# Patient Record
Sex: FEMALE | Race: WHITE | Employment: UNEMPLOYED
[De-identification: names, ages, dates, MRNs, and addresses within clinical notes are randomized per-mention and may not be internally consistent; named-entity substitution may affect disease eponyms.]

---

## 2017-01-25 PROCEDURE — 87624 HPV HI-RISK TYP POOLED RSLT: CPT | Performed by: OBSTETRICS & GYNECOLOGY

## 2017-01-25 PROCEDURE — 88175 CYTOPATH C/V AUTO FLUID REDO: CPT | Performed by: OBSTETRICS & GYNECOLOGY

## 2017-04-06 ENCOUNTER — SURGERY (OUTPATIENT)
Age: 37
End: 2017-04-06

## 2017-04-06 ENCOUNTER — HOSPITAL ENCOUNTER (OUTPATIENT)
Facility: HOSPITAL | Age: 37
Setting detail: OBSERVATION
Discharge: HOME | End: 2017-04-07
Attending: SURGERY | Admitting: SURGERY
Payer: COMMERCIAL

## 2017-04-06 PROBLEM — Z90.10 H/O MASTECTOMY: Status: ACTIVE | Noted: 2017-04-06

## 2017-04-06 PROCEDURE — 0HHV0NZ INSERTION OF TISSUE EXPANDER INTO BILATERAL BREAST, OPEN APPROACH: ICD-10-PCS | Performed by: PLASTIC SURGERY

## 2017-04-06 PROCEDURE — 86803 HEPATITIS C AB TEST: CPT | Performed by: PREVENTIVE MEDICINE

## 2017-04-06 PROCEDURE — 81025 URINE PREGNANCY TEST: CPT | Performed by: SURGERY

## 2017-04-06 PROCEDURE — 86703 HIV-1/HIV-2 1 RESULT ANTBDY: CPT | Performed by: PREVENTIVE MEDICINE

## 2017-04-06 PROCEDURE — 88307 TISSUE EXAM BY PATHOLOGIST: CPT | Performed by: SURGERY

## 2017-04-06 PROCEDURE — 87340 HEPATITIS B SURFACE AG IA: CPT | Performed by: PREVENTIVE MEDICINE

## 2017-04-06 PROCEDURE — 0HBV0ZZ EXCISION OF BILATERAL BREAST, OPEN APPROACH: ICD-10-PCS | Performed by: SURGERY

## 2017-04-06 DEVICE — IMPLANTABLE DEVICE: Type: IMPLANTABLE DEVICE | Site: BREAST | Status: FUNCTIONAL

## 2017-04-06 DEVICE — MATRIX TISS 20X16CM ALDRM THK: Type: IMPLANTABLE DEVICE | Site: BREAST | Status: FUNCTIONAL

## 2017-04-06 RX ORDER — FAMOTIDINE 20 MG/1
20 TABLET ORAL 2 TIMES DAILY
Status: DISCONTINUED | OUTPATIENT
Start: 2017-04-06 | End: 2017-04-07

## 2017-04-06 RX ORDER — ONDANSETRON 2 MG/ML
4 INJECTION INTRAMUSCULAR; INTRAVENOUS AS NEEDED
Status: DISCONTINUED | OUTPATIENT
Start: 2017-04-06 | End: 2017-04-06 | Stop reason: HOSPADM

## 2017-04-06 RX ORDER — KETOROLAC TROMETHAMINE 15 MG/ML
15 INJECTION, SOLUTION INTRAMUSCULAR; INTRAVENOUS EVERY 6 HOURS PRN
Status: DISCONTINUED | OUTPATIENT
Start: 2017-04-06 | End: 2017-04-07

## 2017-04-06 RX ORDER — FAMOTIDINE 10 MG/ML
20 INJECTION, SOLUTION INTRAVENOUS 2 TIMES DAILY
Status: DISCONTINUED | OUTPATIENT
Start: 2017-04-06 | End: 2017-04-07

## 2017-04-06 RX ORDER — METOCLOPRAMIDE HYDROCHLORIDE 5 MG/ML
10 INJECTION INTRAMUSCULAR; INTRAVENOUS AS NEEDED
Status: DISCONTINUED | OUTPATIENT
Start: 2017-04-06 | End: 2017-04-06 | Stop reason: HOSPADM

## 2017-04-06 RX ORDER — SODIUM CHLORIDE, SODIUM LACTATE, POTASSIUM CHLORIDE, CALCIUM CHLORIDE 600; 310; 30; 20 MG/100ML; MG/100ML; MG/100ML; MG/100ML
INJECTION, SOLUTION INTRAVENOUS CONTINUOUS
Status: DISCONTINUED | OUTPATIENT
Start: 2017-04-06 | End: 2017-04-06

## 2017-04-06 RX ORDER — METOCLOPRAMIDE HYDROCHLORIDE 5 MG/ML
10 INJECTION INTRAMUSCULAR; INTRAVENOUS EVERY 6 HOURS PRN
Status: DISCONTINUED | OUTPATIENT
Start: 2017-04-06 | End: 2017-04-07

## 2017-04-06 RX ORDER — ONDANSETRON 2 MG/ML
INJECTION INTRAMUSCULAR; INTRAVENOUS
Status: COMPLETED
Start: 2017-04-06 | End: 2017-04-06

## 2017-04-06 RX ORDER — KETOROLAC TROMETHAMINE 30 MG/ML
30 INJECTION, SOLUTION INTRAMUSCULAR; INTRAVENOUS EVERY 6 HOURS PRN
Status: DISCONTINUED | OUTPATIENT
Start: 2017-04-06 | End: 2017-04-07

## 2017-04-06 RX ORDER — DOCUSATE SODIUM 100 MG/1
100 CAPSULE, LIQUID FILLED ORAL 2 TIMES DAILY
Status: DISCONTINUED | OUTPATIENT
Start: 2017-04-06 | End: 2017-04-07

## 2017-04-06 RX ORDER — SODIUM CHLORIDE, SODIUM LACTATE, POTASSIUM CHLORIDE, CALCIUM CHLORIDE 600; 310; 30; 20 MG/100ML; MG/100ML; MG/100ML; MG/100ML
INJECTION, SOLUTION INTRAVENOUS CONTINUOUS
Status: DISCONTINUED | OUTPATIENT
Start: 2017-04-06 | End: 2017-04-07

## 2017-04-06 RX ORDER — LORAZEPAM 0.5 MG/1
0.5 TABLET ORAL 3 TIMES DAILY PRN
Status: DISCONTINUED | OUTPATIENT
Start: 2017-04-06 | End: 2017-04-07

## 2017-04-06 RX ORDER — DEXAMETHASONE SODIUM PHOSPHATE 4 MG/ML
4 VIAL (ML) INJECTION AS NEEDED
Status: DISCONTINUED | OUTPATIENT
Start: 2017-04-06 | End: 2017-04-06 | Stop reason: HOSPADM

## 2017-04-06 RX ORDER — DIPHENHYDRAMINE HYDROCHLORIDE 50 MG/ML
12.5 INJECTION INTRAMUSCULAR; INTRAVENOUS AS NEEDED
Status: DISCONTINUED | OUTPATIENT
Start: 2017-04-06 | End: 2017-04-06 | Stop reason: HOSPADM

## 2017-04-06 RX ORDER — MEPERIDINE HYDROCHLORIDE 25 MG/ML
12.5 INJECTION INTRAMUSCULAR; INTRAVENOUS; SUBCUTANEOUS AS NEEDED
Status: DISCONTINUED | OUTPATIENT
Start: 2017-04-06 | End: 2017-04-06 | Stop reason: HOSPADM

## 2017-04-06 RX ORDER — HYDROMORPHONE HYDROCHLORIDE 1 MG/ML
INJECTION, SOLUTION INTRAMUSCULAR; INTRAVENOUS; SUBCUTANEOUS
Status: COMPLETED
Start: 2017-04-06 | End: 2017-04-06

## 2017-04-06 RX ORDER — BISACODYL 10 MG
10 SUPPOSITORY, RECTAL RECTAL
Status: DISCONTINUED | OUTPATIENT
Start: 2017-04-06 | End: 2017-04-07

## 2017-04-06 RX ORDER — HYDROMORPHONE HYDROCHLORIDE 1 MG/ML
0.4 INJECTION, SOLUTION INTRAMUSCULAR; INTRAVENOUS; SUBCUTANEOUS EVERY 5 MIN PRN
Status: DISCONTINUED | OUTPATIENT
Start: 2017-04-06 | End: 2017-04-06 | Stop reason: HOSPADM

## 2017-04-06 RX ORDER — SODIUM PHOSPHATE, DIBASIC AND SODIUM PHOSPHATE, MONOBASIC 7; 19 G/133ML; G/133ML
1 ENEMA RECTAL
Status: DISCONTINUED | OUTPATIENT
Start: 2017-04-06 | End: 2017-04-07

## 2017-04-06 RX ORDER — CETIRIZINE HYDROCHLORIDE 10 MG/1
10 TABLET ORAL DAILY
Status: DISCONTINUED | OUTPATIENT
Start: 2017-04-06 | End: 2017-04-07

## 2017-04-06 RX ORDER — DEXTROSE AND SODIUM CHLORIDE 5; .9 G/100ML; G/100ML
INJECTION, SOLUTION INTRAVENOUS CONTINUOUS
Status: DISCONTINUED | OUTPATIENT
Start: 2017-04-06 | End: 2017-04-07

## 2017-04-06 RX ORDER — CYCLOBENZAPRINE HCL 10 MG
10 TABLET ORAL NIGHTLY
Status: DISCONTINUED | OUTPATIENT
Start: 2017-04-06 | End: 2017-04-07

## 2017-04-06 RX ORDER — ONDANSETRON 2 MG/ML
4 INJECTION INTRAMUSCULAR; INTRAVENOUS EVERY 6 HOURS PRN
Status: DISCONTINUED | OUTPATIENT
Start: 2017-04-06 | End: 2017-04-07

## 2017-04-06 RX ORDER — HYDROMORPHONE HYDROCHLORIDE 1 MG/ML
0.4 INJECTION, SOLUTION INTRAMUSCULAR; INTRAVENOUS; SUBCUTANEOUS
Status: DISCONTINUED | OUTPATIENT
Start: 2017-04-06 | End: 2017-04-07

## 2017-04-06 RX ORDER — ALBUTEROL SULFATE 90 UG/1
2 AEROSOL, METERED RESPIRATORY (INHALATION) EVERY 4 HOURS PRN
Status: DISCONTINUED | OUTPATIENT
Start: 2017-04-06 | End: 2017-04-07

## 2017-04-06 RX ORDER — CETIRIZINE HYDROCHLORIDE 10 MG/1
10 TABLET ORAL DAILY
COMMUNITY
End: 2019-05-16

## 2017-04-06 RX ORDER — MIDAZOLAM HYDROCHLORIDE 1 MG/ML
1 INJECTION INTRAMUSCULAR; INTRAVENOUS EVERY 5 MIN PRN
Status: DISCONTINUED | OUTPATIENT
Start: 2017-04-06 | End: 2017-04-06 | Stop reason: HOSPADM

## 2017-04-06 RX ORDER — OXYCODONE HYDROCHLORIDE 5 MG/1
5 TABLET ORAL EVERY 4 HOURS PRN
Status: DISCONTINUED | OUTPATIENT
Start: 2017-04-06 | End: 2017-04-07

## 2017-04-06 RX ORDER — NALOXONE HYDROCHLORIDE 0.4 MG/ML
80 INJECTION, SOLUTION INTRAMUSCULAR; INTRAVENOUS; SUBCUTANEOUS AS NEEDED
Status: DISCONTINUED | OUTPATIENT
Start: 2017-04-06 | End: 2017-04-06 | Stop reason: HOSPADM

## 2017-04-06 NOTE — H&P
Pre-op Diagnosis: BRCA POSITIVE FAMILY HISTORY BREAST CANCER      The above referenced H&P was reviewed by Nohemi Kate MD on 4/6/2017, the patient was examined and no significant changes have occurred in the patient's condition since the H&P was performe

## 2017-04-06 NOTE — H&P
[facility]  Report of history and physical    [facility] Doctor Patient Status:  Outpatient in a Bed    1980 MRN [id number]   Location [address] [place] [address] Attending Gonzalez Hansen MD   Hosp Day # 0 PCP Marilynn Melendez Peripartum cardiomyopathy    • Allergic rhinitis    • Asthma    • Obesity    • Cardiomyopathy, peripartum    • Visual impairment      glasses   • PONV (postoperative nausea and vomiting)          Past Surgical History    OTHER SURGICAL HISTORY  2000    Com (1.651 m), weight 205 lb (92.987 kg), last menstrual period 03/01/2017, SpO2 98 %, not currently breastfeeding. General: Alert, orientated x3. Cooperative. No apparent distress. HEENT: Exam is unremarkable. Without scleral icterus.   Mucous membranes dilated ductal segment  at 10:00 870 from the nipple. There are no sonographically worrisome solid masses. No abnormal  acoustic shadowing.  Specifically there is no focal sonographic findings to correspond to the  abnormal MRI enhancement or focal asymmetr previously recommended. 2. Probably benign findings left breast without sonographic correlate. Follow-up diagnostic  mammogram left breast at 6 months is recommended as well as follow-up MRI as previously indicated.   These findings and recommendations wer recommended for further evaluation. If a sonographic correlate is not found, a  six-month follow-up MRI is recommended. 3. No suspicious MRI findings in the region of reported breast thickening in the both breasts.   Recommend bilateral diagnostic mammogra

## 2017-04-06 NOTE — BRIEF OP NOTE
[address] [facility]  Brief Op Note     Elida Bernal Location: OR   [facility] [id number] N [id number]   Admission Date 4/6/2017 Operation Date 4/6/2017   Attending Physician Sheldon Charles MD Operating Physician Montrell Limon MD       Pre-Operative Daniela

## 2017-04-06 NOTE — BRIEF OP NOTE
[facility]  Brief Op Note     Sheldon Abhay Location: OR   CSN [id number] MRN [id number]   Admission Date 4/6/2017 Operation Date 4/6/2017   Attending Physician Aren Miranda MD Operating Physician Saud Pandey MD       Pre-Operative Diag

## 2017-04-06 NOTE — OPERATIVE REPORT
[facility]    PATIENT'S NAME: Betina Holt   ATTENDING PHYSICIAN: Kenia Cadena M.D. OPERATING PHYSICIAN: Kenia Cadena M.D.    PATIENT ACCOUNT#:   [de-identified]    LOCATION:  PACU [facility] PACU 5 EDWP 10  MEDICAL RECORD #:   [id number]       DATE OF BIRTH: marked in the holding area by Dr. Christiano Toscano. Incision was made on the right side in the inframammary ridge and using a 15 blade knife, electric Bovie cautery was used to separate the subcutaneous tissue.   Then the breast was released from the subcutaneous tissu

## 2017-04-06 NOTE — OPERATIVE REPORT
Pre-Operative Diagnosis: BRCA POSITIVE FAMILY HISTORY BREAST CANCER   Post-Operative Diagnosis: BRCA POSITIVE FAMILY HISTORY BREAST CANCER   Procedure Performed:   Procedure(s):   BILATERAL NIPPLE SPARRING MASTECTOMY (MORRO MCKENZIE);BILATERAL   BREAST RECONSTRUCT sterile fashion, placed supine on the operating room table. General anesthesia was administered by the anesthesia service. Dr. Shirin Diallo performed bilateral mastectomies bilaterally which will be dictated as a separate operative procedure.  Upon Dr. Hui Martinez antibiotic irrigation solution and was placed in the left breast subpectoral pocket. The interior aspect of the AlloDerm was affixed to the chest wall with interrupted 3-0 PDS suture.  A 19-Danish Angel drain was left below the mastectomy flap, brought out

## 2017-04-07 VITALS
WEIGHT: 205 LBS | TEMPERATURE: 98 F | OXYGEN SATURATION: 98 % | RESPIRATION RATE: 16 BRPM | BODY MASS INDEX: 34.16 KG/M2 | HEIGHT: 65 IN | HEART RATE: 106 BPM | DIASTOLIC BLOOD PRESSURE: 68 MMHG | SYSTOLIC BLOOD PRESSURE: 106 MMHG

## 2017-04-07 PROBLEM — Z80.3 FAMILY HISTORY OF BREAST CANCER: Status: ACTIVE | Noted: 2017-04-07

## 2017-04-07 RX ORDER — CEFADROXIL 500 MG/1
500 CAPSULE ORAL 2 TIMES DAILY
Qty: 20 CAPSULE | Refills: 0 | Status: SHIPPED | OUTPATIENT
Start: 2017-04-07 | End: 2017-04-17

## 2017-04-07 RX ORDER — OXYCODONE HYDROCHLORIDE 5 MG/1
5 TABLET ORAL EVERY 4 HOURS PRN
Qty: 30 TABLET | Refills: 0 | Status: SHIPPED | OUTPATIENT
Start: 2017-04-07 | End: 2017-04-25

## 2017-04-07 NOTE — PLAN OF CARE
GASTROINTESTINAL - ADULT    • Minimal or absence of nausea and vomiting Progressing        PAIN - ADULT    • Verbalizes/displays adequate comfort level or patient's stated pain goal Progressing        RESPIRATORY - ADULT    • Achieves optimal ventilation a

## 2017-04-07 NOTE — PLAN OF CARE
Problem: Patient/Family Goals  Goal: Patient/Family Short Term Goal  Patient’s Short Term Goal: 'i want to feel better & go home today'    Interventions:   - toradol given for c/o pain.   States she will walk more  - See additional Care Plan goals for speci integrity  - Assess and document dressing/incision, wound bed, drain sites and surrounding tissue  - Implement wound care per orders  - Initiate isolation precautions as appropriate  - Initiate Pressure Ulcer prevention bundle as indicated   Outcome: Adequ

## 2017-04-07 NOTE — PAYOR COMM NOTE
Attending Physician: Maida Reeves MD    Review Type: ADMISSION   Reviewer: Vannesa Song       Date: April 7, 2017 - 8:53 AM  Payor: [employer]  Authorization Number: [id number]  Admit date: 4/6/2017  7:38 AM       Direct for OR    Operative Report by Lc Du infusion     Date Action Dose Route User    4/7/2017 0400 New Bag (none) Intravenous Yaneli Eric RN    4/6/2017 1400 New Bag (none) Intravenous Maryanne Salvador RN      docusate sodium (COLACE) cap 100 mg     Date Action Dose Route User    4/6/201 Normal   POCT PREGNANCY, URINE   SURGICAL PATHOLOGY TISSUE

## 2017-04-27 ENCOUNTER — SURGERY (OUTPATIENT)
Age: 37
End: 2017-04-27

## 2017-04-27 ENCOUNTER — HOSPITAL ENCOUNTER (OUTPATIENT)
Facility: HOSPITAL | Age: 37
Setting detail: HOSPITAL OUTPATIENT SURGERY
Discharge: HOME | End: 2017-04-27
Attending: PLASTIC SURGERY | Admitting: PLASTIC SURGERY
Payer: COMMERCIAL

## 2017-04-27 VITALS
HEIGHT: 65 IN | RESPIRATION RATE: 15 BRPM | SYSTOLIC BLOOD PRESSURE: 117 MMHG | WEIGHT: 205 LBS | DIASTOLIC BLOOD PRESSURE: 86 MMHG | BODY MASS INDEX: 34.16 KG/M2 | TEMPERATURE: 98 F | HEART RATE: 78 BPM | OXYGEN SATURATION: 99 %

## 2017-04-27 PROCEDURE — 0HX5XZZ TRANSFER CHEST SKIN, EXTERNAL APPROACH: ICD-10-PCS | Performed by: PLASTIC SURGERY

## 2017-04-27 PROCEDURE — 0HBVXZZ: ICD-10-PCS | Performed by: PLASTIC SURGERY

## 2017-04-27 PROCEDURE — 88304 TISSUE EXAM BY PATHOLOGIST: CPT | Performed by: PLASTIC SURGERY

## 2017-04-27 PROCEDURE — 81025 URINE PREGNANCY TEST: CPT | Performed by: PLASTIC SURGERY

## 2017-04-27 RX ORDER — BUPIVACAINE HYDROCHLORIDE 5 MG/ML
INJECTION, SOLUTION EPIDURAL; INTRACAUDAL AS NEEDED
Status: DISCONTINUED | OUTPATIENT
Start: 2017-04-27 | End: 2017-04-27 | Stop reason: HOSPADM

## 2017-04-27 RX ORDER — LIDOCAINE HYDROCHLORIDE 10 MG/ML
INJECTION, SOLUTION EPIDURAL; INFILTRATION; INTRACAUDAL; PERINEURAL AS NEEDED
Status: DISCONTINUED | OUTPATIENT
Start: 2017-04-27 | End: 2017-04-27 | Stop reason: HOSPADM

## 2017-04-27 NOTE — BRIEF OP NOTE
Pre-Operative Diagnosis: OPEN WOUNDS; BILATERAL BREASTS     Post-Operative Diagnosis: OPEN WOUNDS; BILATERAL BREASTS     Procedure Performed:   Procedure(s):  EXCISION OF BILATERAL INFRAMAMMARY WOUNDS    Surgeon(s) and Role:     Anthony Bailey MD - Pr

## 2017-04-27 NOTE — OR NURSING
Dr. Param Garrido here post procedure to speak with patient. Patient states she understands how to manage surgical sites, had been instructed prior to procedure. Also states she knows how to care for and drain ANDREA drains.  She states she has no further questions regar

## 2017-04-27 NOTE — H&P
Plastic Surgery History and Physical  CC: bilateral breast wound    History of present illness: Seda Sharma is a 31year old  S/p DTI for breast reconstruction now with open wounds in bilateral IMF incision.  Here for excision and closure of the wound 96%  LMP 04/02/2017  GENERAL: Well-developed, well-nourished patient in no apparent distress,  HEENT: Extraocular motor function is normal. Sclera anicteric. Nose is midline. NECK: Full range of motion, supple, trachea is midline.    CARDIOVASCULAR: Palpa

## 2017-04-27 NOTE — OPERATIVE REPORT
Pre-Operative Diagnosis: OPEN WOUNDS; BILATERAL BREASTS     Post-Operative Diagnosis: OPEN WOUNDS; BILATERAL BREASTS     Procedure Performed:    Procedure(s):  EXCISION OF BILATERAL INFRAMAMMARY WOUNDS, Adjacent tissue transfer (9x3 cm) bilaterally.    Surg 3-0 monocryl deep dermal sutures followed by 4-0 monocryl subcuticular suture. Telfa and tegaderm was applied. Our attention then turned to the left breast. There was a 4 mm dehiscence of the IMF wound.  An elliptical area of 9 cm x 3cm was marked in th

## 2017-07-19 PROCEDURE — 36415 COLL VENOUS BLD VENIPUNCTURE: CPT | Performed by: OBSTETRICS & GYNECOLOGY

## 2017-07-19 PROCEDURE — 86304 IMMUNOASSAY TUMOR CA 125: CPT | Performed by: OBSTETRICS & GYNECOLOGY

## 2018-03-23 PROBLEM — E66.9 OBESITY (BMI 30-39.9): Status: ACTIVE | Noted: 2018-03-23

## 2018-07-18 PROCEDURE — 86304 IMMUNOASSAY TUMOR CA 125: CPT | Performed by: OBSTETRICS & GYNECOLOGY

## 2019-03-29 PROCEDURE — 87624 HPV HI-RISK TYP POOLED RSLT: CPT | Performed by: OBSTETRICS & GYNECOLOGY

## 2019-03-29 PROCEDURE — 88175 CYTOPATH C/V AUTO FLUID REDO: CPT | Performed by: OBSTETRICS & GYNECOLOGY

## 2020-02-13 ENCOUNTER — ANESTHESIA EVENT (OUTPATIENT)
Dept: ENDOSCOPY | Facility: HOSPITAL | Age: 40
End: 2020-02-13
Payer: COMMERCIAL

## 2020-02-13 ENCOUNTER — HOSPITAL ENCOUNTER (OUTPATIENT)
Facility: HOSPITAL | Age: 40
Setting detail: HOSPITAL OUTPATIENT SURGERY
Discharge: HOME | End: 2020-02-13
Attending: INTERNAL MEDICINE | Admitting: INTERNAL MEDICINE
Payer: COMMERCIAL

## 2020-02-13 ENCOUNTER — ANESTHESIA (OUTPATIENT)
Dept: ENDOSCOPY | Facility: HOSPITAL | Age: 40
End: 2020-02-13
Payer: COMMERCIAL

## 2020-02-13 DIAGNOSIS — Z80.0 FAMILY HISTORY OF PANCREATIC CANCER: ICD-10-CM

## 2020-02-13 DIAGNOSIS — Z15.01 BRCA2 POSITIVE: ICD-10-CM

## 2020-02-13 DIAGNOSIS — Z15.09 BRCA2 POSITIVE: ICD-10-CM

## 2020-02-13 DIAGNOSIS — Z15.89 BIALLELIC MUTATION OF APC GENE: ICD-10-CM

## 2020-02-13 LAB — B-HCG UR QL: NEGATIVE

## 2020-02-13 PROCEDURE — 0DBK8ZX EXCISION OF ASCENDING COLON, VIA NATURAL OR ARTIFICIAL OPENING ENDOSCOPIC, DIAGNOSTIC: ICD-10-PCS | Performed by: INTERNAL MEDICINE

## 2020-02-13 PROCEDURE — 88305 TISSUE EXAM BY PATHOLOGIST: CPT | Performed by: INTERNAL MEDICINE

## 2020-02-13 PROCEDURE — 88312 SPECIAL STAINS GROUP 1: CPT | Performed by: INTERNAL MEDICINE

## 2020-02-13 PROCEDURE — 0DBH8ZX EXCISION OF CECUM, VIA NATURAL OR ARTIFICIAL OPENING ENDOSCOPIC, DIAGNOSTIC: ICD-10-PCS | Performed by: INTERNAL MEDICINE

## 2020-02-13 PROCEDURE — 0DB68ZX EXCISION OF STOMACH, VIA NATURAL OR ARTIFICIAL OPENING ENDOSCOPIC, DIAGNOSTIC: ICD-10-PCS | Performed by: INTERNAL MEDICINE

## 2020-02-13 PROCEDURE — 0DJ08ZZ INSPECTION OF UPPER INTESTINAL TRACT, VIA NATURAL OR ARTIFICIAL OPENING ENDOSCOPIC: ICD-10-PCS | Performed by: INTERNAL MEDICINE

## 2020-02-13 PROCEDURE — 81025 URINE PREGNANCY TEST: CPT

## 2020-02-13 PROCEDURE — 0DBP8ZX EXCISION OF RECTUM, VIA NATURAL OR ARTIFICIAL OPENING ENDOSCOPIC, DIAGNOSTIC: ICD-10-PCS | Performed by: INTERNAL MEDICINE

## 2020-02-13 PROCEDURE — 0DBL8ZX EXCISION OF TRANSVERSE COLON, VIA NATURAL OR ARTIFICIAL OPENING ENDOSCOPIC, DIAGNOSTIC: ICD-10-PCS | Performed by: INTERNAL MEDICINE

## 2020-02-13 RX ORDER — HYDROMORPHONE HYDROCHLORIDE 1 MG/ML
0.6 INJECTION, SOLUTION INTRAMUSCULAR; INTRAVENOUS; SUBCUTANEOUS EVERY 5 MIN PRN
Status: CANCELLED | OUTPATIENT
Start: 2020-02-13

## 2020-02-13 RX ORDER — PROCHLORPERAZINE EDISYLATE 5 MG/ML
5 INJECTION INTRAMUSCULAR; INTRAVENOUS ONCE AS NEEDED
Status: CANCELLED | OUTPATIENT
Start: 2020-02-13 | End: 2020-02-13

## 2020-02-13 RX ORDER — ONDANSETRON 2 MG/ML
4 INJECTION INTRAMUSCULAR; INTRAVENOUS ONCE AS NEEDED
Status: CANCELLED | OUTPATIENT
Start: 2020-02-13 | End: 2020-02-13

## 2020-02-13 RX ORDER — SODIUM CHLORIDE, SODIUM LACTATE, POTASSIUM CHLORIDE, CALCIUM CHLORIDE 600; 310; 30; 20 MG/100ML; MG/100ML; MG/100ML; MG/100ML
INJECTION, SOLUTION INTRAVENOUS CONTINUOUS
Status: DISCONTINUED | OUTPATIENT
Start: 2020-02-13 | End: 2020-02-13

## 2020-02-13 RX ORDER — SODIUM CHLORIDE, SODIUM LACTATE, POTASSIUM CHLORIDE, CALCIUM CHLORIDE 600; 310; 30; 20 MG/100ML; MG/100ML; MG/100ML; MG/100ML
INJECTION, SOLUTION INTRAVENOUS CONTINUOUS
Status: CANCELLED | OUTPATIENT
Start: 2020-02-13

## 2020-02-13 RX ORDER — HYDROMORPHONE HYDROCHLORIDE 1 MG/ML
0.2 INJECTION, SOLUTION INTRAMUSCULAR; INTRAVENOUS; SUBCUTANEOUS EVERY 5 MIN PRN
Status: CANCELLED | OUTPATIENT
Start: 2020-02-13

## 2020-02-13 RX ORDER — NALOXONE HYDROCHLORIDE 0.4 MG/ML
80 INJECTION, SOLUTION INTRAMUSCULAR; INTRAVENOUS; SUBCUTANEOUS AS NEEDED
Status: CANCELLED | OUTPATIENT
Start: 2020-02-13 | End: 2020-02-13

## 2020-02-13 RX ORDER — MORPHINE SULFATE 10 MG/ML
6 INJECTION, SOLUTION INTRAMUSCULAR; INTRAVENOUS EVERY 10 MIN PRN
Status: CANCELLED | OUTPATIENT
Start: 2020-02-13

## 2020-02-13 RX ORDER — HALOPERIDOL 5 MG/ML
0.25 INJECTION INTRAMUSCULAR ONCE AS NEEDED
Status: CANCELLED | OUTPATIENT
Start: 2020-02-13 | End: 2020-02-13

## 2020-02-13 RX ORDER — HYDROCODONE BITARTRATE AND ACETAMINOPHEN 5; 325 MG/1; MG/1
2 TABLET ORAL AS NEEDED
Status: CANCELLED | OUTPATIENT
Start: 2020-02-13

## 2020-02-13 RX ORDER — MORPHINE SULFATE 4 MG/ML
2 INJECTION, SOLUTION INTRAMUSCULAR; INTRAVENOUS EVERY 10 MIN PRN
Status: CANCELLED | OUTPATIENT
Start: 2020-02-13

## 2020-02-13 RX ORDER — MORPHINE SULFATE 4 MG/ML
4 INJECTION, SOLUTION INTRAMUSCULAR; INTRAVENOUS EVERY 10 MIN PRN
Status: CANCELLED | OUTPATIENT
Start: 2020-02-13

## 2020-02-13 RX ORDER — MIDAZOLAM HYDROCHLORIDE 1 MG/ML
INJECTION INTRAMUSCULAR; INTRAVENOUS AS NEEDED
Status: DISCONTINUED | OUTPATIENT
Start: 2020-02-13 | End: 2020-02-13 | Stop reason: SURG

## 2020-02-13 RX ORDER — HYDROCODONE BITARTRATE AND ACETAMINOPHEN 5; 325 MG/1; MG/1
1 TABLET ORAL AS NEEDED
Status: CANCELLED | OUTPATIENT
Start: 2020-02-13

## 2020-02-13 RX ORDER — HYDROMORPHONE HYDROCHLORIDE 1 MG/ML
0.4 INJECTION, SOLUTION INTRAMUSCULAR; INTRAVENOUS; SUBCUTANEOUS EVERY 5 MIN PRN
Status: CANCELLED | OUTPATIENT
Start: 2020-02-13

## 2020-02-13 RX ADMIN — MIDAZOLAM HYDROCHLORIDE 2 MG: 1 INJECTION INTRAMUSCULAR; INTRAVENOUS at 12:47:00

## 2020-02-13 RX ADMIN — SODIUM CHLORIDE, SODIUM LACTATE, POTASSIUM CHLORIDE, CALCIUM CHLORIDE: 600; 310; 30; 20 INJECTION, SOLUTION INTRAVENOUS at 13:53:00

## 2020-02-13 NOTE — OPERATIVE REPORT
PATIENT NAME: Kiran Martins  MRN: [id number]  DATE OF OPERATION: 2/13/2020  PREOPERATIVE DIAGNOSIS:   1.  Strong family history of pancreatic cancer in a patient who tested positive for BRCA 2 (father at age 40, paternal uncle at age 48, both were smoker The patient tolerated the procedure well. There were no immediate complications. FINDINGS:  1. Normal esophagus with no evidence of esophagitis, stricture, ulceration, mass or other abnormalities. The squamocolumnar junction was intact.  The esophagogas video adult variable stiffness colonoscope was introduced into the anus and advanced through the colon to the cecum, after placement of the Endo cuff distal end detachable cap. . The cecum was identified by the ileocecal valve and appendiceal orifice.      I ultrasound (both on annual basis with 6-month in between each study)  2. Most likely colonoscopy in 6-month then annually.   3. If confirmed to be serrated polyposis syndrome will need to discuss the need for family members to undergo colonoscopic Precilla Gerry

## 2020-02-13 NOTE — ANESTHESIA POSTPROCEDURE EVALUATION
Patient: Dena Rodriguez    Procedure Summary     Date:  02/13/20 Room / Location:  [address] [place] [address] ENDOSCOPY 01 / [address] [place] [address] ENDOSCOPY    Anesthesia Start:  1250 Anesthesia Stop:  8404    Procedures:       COLONOSCOPY (N/A )      ESOPHAGOGASTRODUODENOSCOPY (EGD) (N/A )

## 2020-02-13 NOTE — ANESTHESIA PREPROCEDURE EVALUATION
Anesthesia PreOp Note    HPI:     Bony Lr is a 42year old female who presents for preoperative consultation requested by: Inga Case MD    Date of Surgery: 2/13/2020    Procedure(s):  COLONOSCOPY  ESOPHAGOGASTRODUODENOSCOPY (EGD)  ENDOSCOP at [facility] MAIN OR   • BREAST RECONSTRUCTION/ IMMEDIATE/EXPANDER Bilateral 4/6/2017    Performed by Angelo Chawla MD at [facility] MAIN OR   • BREAST SIMPLE MASTECTOMY Bilateral 4/6/2017    Performed by Alex Fleming MD at [facility] MAIN OR   • COLPOSCOPY, CERVIX W/UPPER A As Local    Family History   Problem Relation Age of Onset   • Cancer Father         Pacreatic   • Cancer Paternal Uncle         pancreas   • Cancer Paternal Cousin Male         brain   • Cancer Paternal Grandmother         breast   • Breast Cancer Renetta Benavidez Narrative      Not on file      Available pre-op labs reviewed.   Lab Results   Component Value Date    WBC 12.66 01/31/2020    RBC 5.24 (H) 01/31/2020    HGB 15.0 01/31/2020    HCT 46.5 01/31/2020    MCV 88.7 01/31/2020    MCH 28.6 01/31/2020    MCHC 32.3 AM

## 2020-02-14 VITALS
RESPIRATION RATE: 11 BRPM | OXYGEN SATURATION: 99 % | WEIGHT: 207 LBS | BODY MASS INDEX: 34.49 KG/M2 | SYSTOLIC BLOOD PRESSURE: 118 MMHG | DIASTOLIC BLOOD PRESSURE: 84 MMHG | HEIGHT: 65 IN | HEART RATE: 70 BPM

## 2020-02-19 NOTE — PROGRESS NOTES
2/19/2020  [address] [id number]    Dear Praful Lyle,       Here are the biopsy/pathology findings from your recent EGD (upper endoscopy) and colonoscopy:     The biopsy/pathology findings from your upper endoscopy showed

## 2020-02-27 NOTE — H&P
[facility] Department of  Gastroenterology  Update Health History :       Bita Bae  female   Fredis Esposito MD     [id number]  9/23/1980 Primary Care Physician  Wanda Castillo MD        HPI :  Patient has a strong history of pancreat ENDOSCOPIC ULTRASOUND (EUS) N/A 2/13/2020    Performed by Rodriguez Lam MD at [facility] ENDOSCOPY   • ESOPHAGOGASTRODUODENOSCOPY (EGD) N/A 2/13/2020    Performed by Rodriguez Lam MD at [facility] ENDOSCOPY   • MASTECTOMY LEFT     • MASTECTOMY RIGHT     • OTHER HOGAN mouth., Disp: , Rfl:   Calcipotriene-Betameth Diprop (ENSTILAR) 0.005-0.064 % External Foam, Apply 1 Application topically daily. , Disp: , Rfl:   Fluticasone Propionate 50 MCG/ACT Nasal Suspension, 2 sprays by Nasal route daily. , Disp: 1 Inhaler, Rfl: 12

## 2020-11-16 ENCOUNTER — LAB ENCOUNTER (OUTPATIENT)
Dept: LAB | Facility: HOSPITAL | Age: 40
End: 2020-11-16
Attending: INTERNAL MEDICINE
Payer: COMMERCIAL

## 2020-11-16 DIAGNOSIS — Z01.818 PRE-OP TESTING: ICD-10-CM

## 2020-11-19 ENCOUNTER — ANESTHESIA EVENT (OUTPATIENT)
Dept: ENDOSCOPY | Facility: HOSPITAL | Age: 40
End: 2020-11-19
Payer: COMMERCIAL

## 2020-11-19 ENCOUNTER — HOSPITAL ENCOUNTER (OUTPATIENT)
Facility: HOSPITAL | Age: 40
Setting detail: HOSPITAL OUTPATIENT SURGERY
Discharge: HOME | End: 2020-11-19
Attending: INTERNAL MEDICINE | Admitting: INTERNAL MEDICINE
Payer: COMMERCIAL

## 2020-11-19 ENCOUNTER — ANESTHESIA (OUTPATIENT)
Dept: ENDOSCOPY | Facility: HOSPITAL | Age: 40
End: 2020-11-19
Payer: COMMERCIAL

## 2020-11-19 DIAGNOSIS — Z15.89 BIALLELIC MUTATION OF APC GENE: ICD-10-CM

## 2020-11-19 DIAGNOSIS — Z15.01 BRCA2 POSITIVE: ICD-10-CM

## 2020-11-19 DIAGNOSIS — Z01.818 PRE-OP TESTING: Primary | ICD-10-CM

## 2020-11-19 DIAGNOSIS — Z15.09 BRCA2 POSITIVE: ICD-10-CM

## 2020-11-19 DIAGNOSIS — Z80.0 FAMILY HISTORY OF PANCREATIC CANCER: ICD-10-CM

## 2020-11-19 PROCEDURE — 81025 URINE PREGNANCY TEST: CPT

## 2020-11-19 PROCEDURE — 36415 COLL VENOUS BLD VENIPUNCTURE: CPT | Performed by: INTERNAL MEDICINE

## 2020-11-19 PROCEDURE — 88305 TISSUE EXAM BY PATHOLOGIST: CPT | Performed by: INTERNAL MEDICINE

## 2020-11-19 PROCEDURE — 0DJ08ZZ INSPECTION OF UPPER INTESTINAL TRACT, VIA NATURAL OR ARTIFICIAL OPENING ENDOSCOPIC: ICD-10-PCS | Performed by: INTERNAL MEDICINE

## 2020-11-19 PROCEDURE — 0DBK8ZX EXCISION OF ASCENDING COLON, VIA NATURAL OR ARTIFICIAL OPENING ENDOSCOPIC, DIAGNOSTIC: ICD-10-PCS | Performed by: INTERNAL MEDICINE

## 2020-11-19 RX ORDER — LIDOCAINE HYDROCHLORIDE 10 MG/ML
INJECTION, SOLUTION EPIDURAL; INFILTRATION; INTRACAUDAL; PERINEURAL AS NEEDED
Status: DISCONTINUED | OUTPATIENT
Start: 2020-11-19 | End: 2020-11-19 | Stop reason: SURG

## 2020-11-19 RX ORDER — SODIUM CHLORIDE, SODIUM LACTATE, POTASSIUM CHLORIDE, CALCIUM CHLORIDE 600; 310; 30; 20 MG/100ML; MG/100ML; MG/100ML; MG/100ML
INJECTION, SOLUTION INTRAVENOUS CONTINUOUS
Status: DISCONTINUED | OUTPATIENT
Start: 2020-11-19 | End: 2020-11-19

## 2020-11-19 RX ORDER — NALOXONE HYDROCHLORIDE 0.4 MG/ML
80 INJECTION, SOLUTION INTRAMUSCULAR; INTRAVENOUS; SUBCUTANEOUS AS NEEDED
Status: DISCONTINUED | OUTPATIENT
Start: 2020-11-19 | End: 2020-11-19

## 2020-11-19 RX ADMIN — SODIUM CHLORIDE, SODIUM LACTATE, POTASSIUM CHLORIDE, CALCIUM CHLORIDE: 600; 310; 30; 20 INJECTION, SOLUTION INTRAVENOUS at 14:04:00

## 2020-11-19 RX ADMIN — LIDOCAINE HYDROCHLORIDE 50 MG: 10 INJECTION, SOLUTION EPIDURAL; INFILTRATION; INTRACAUDAL; PERINEURAL at 14:04:00

## 2020-11-19 RX ADMIN — SODIUM CHLORIDE, SODIUM LACTATE, POTASSIUM CHLORIDE, CALCIUM CHLORIDE: 600; 310; 30; 20 INJECTION, SOLUTION INTRAVENOUS at 14:34:00

## 2020-11-19 NOTE — OPERATIVE REPORT
PATIENT NAME: Juno Almaraz  MRN: [id number]  DATE OF OPERATION: 11/19/2020  PREOPERATIVE DIAGNOSIS:   1. Strong family history of pancreatic cancer, in a patient who tested positive for BRCA2.   (Father at the age of 40, paternal uncle at the age of 48, bulb.  The main pancreatic duct appeared to enter the duodenum at the level of the major papilla alongside the bile duct. The bile duct itself did not appear to be dilated. No thickening of the bile duct wall seen. No obvious stones were noted.   The gal serrated polyps to determine if she meets the criteria for serrated polyposis syndrome.     Shellie Simons MD  [facility] Gastroenterology

## 2020-11-19 NOTE — ANESTHESIA PREPROCEDURE EVALUATION
Anesthesia PreOp Note    HPI:     Elida Bernal is a 47year old female who presents for preoperative consultation requested by: Beacher Habermann, MD    Date of Surgery: 11/19/2020    Procedure(s):  COLONOSCOPY  ENDOSCOPIC ULTRASOUND (EUS)  Indication: OR   • BREAST RECONSTRUCTION/ IMMEDIATE/EXPANDER Bilateral 4/6/2017    Performed by Moiz Mahmood MD at [facility] MAIN OR   • BREAST SIMPLE MASTECTOMY Bilateral 4/6/2017    Performed by Андрей Gastelum MD at [address] [place] [address]   • COLONOSCOPY N/A 2/13/2020    Performed Oral Cap, 2,000 Units daily. , Disp: , Rfl: , 11/16/2020    •  PEG 3350-KCl-Na Bicarb-NaCl (TRILYTE) 420 g Oral Recon Soln, Take as directed, split dose, Disp: 1 Bottle, Rfl: 0    •  LORazepam 0.5 MG Oral Tab, Take 1 tablet (0.5 mg total) by mouth every mor Physical activity        Days per week: Not on file        Minutes per session: Not on file      Stress: Not on file    Relationships      Social connections        Talks on phone: Not on file        Gets together: Not on file        Attends [employer] normal.      Systolic function is normal. The estimated ejection fraction is 55-60%.      Wall motion is normal; there are no regional wall motion abnormalities.      Left ventricular diastolic function parameters are normal.   2. Mitral valve:  There is mi

## 2020-11-19 NOTE — H&P
[address] Department of  Gastroenterology  Update Health History :       Sukhi Grout  female   Reji Lyle MD     [id number]  9/23/1980 Primary Care Physician  Chele Abel MD        HPI :  BRCA2 gene mutation.   Family history of 2000    Rhinoplasty, repair deviated septum,polypec   • OTHER SURGICAL HISTORY  4/2017    bilateral mastectomies with nipple sparing - Braca 2      Family History   Problem Relation Age of Onset   • Cancer Father         Pacreatic   • Cancer Paternal Uncle 12    •  Multiple Vitamins-Minerals (MULTIVITAMIN OR), Take 1 tablet by mouth daily. , Disp: , Rfl:     •  Cholecalciferol (VITAMIN D) 1000 UNIT Oral Cap, 2,000 Units daily. , Disp: , Rfl:     •  PEG 3350-KCl-Na Bicarb-NaCl (TRILYTE) 420 g Oral Recon Soln,

## 2020-11-19 NOTE — ANESTHESIA POSTPROCEDURE EVALUATION
Patient: Elida Bernal    Procedure Summary     Date: 11/19/20 Room / Location: [facility] ENDOSCOPY 01 / [facility] ENDOSCOPY    Anesthesia Start: 8139 Anesthesia Stop:     Procedures:       COLONOSCOPY (N/A )      ENDOSCOPIC ULTRASOUND (EUS) (N/A ) Diagnosis:

## 2020-11-20 VITALS
DIASTOLIC BLOOD PRESSURE: 70 MMHG | OXYGEN SATURATION: 98 % | BODY MASS INDEX: 34.99 KG/M2 | RESPIRATION RATE: 20 BRPM | HEIGHT: 65 IN | HEART RATE: 90 BPM | TEMPERATURE: 98 F | SYSTOLIC BLOOD PRESSURE: 116 MMHG | WEIGHT: 210 LBS

## 2020-11-20 NOTE — PROGRESS NOTES
11/20/2020  Timoteo Cruzes  [address] [address]    Dear Bubba Crowe,       Here are the biopsy/pathology findings from your recent endoscopic ultrasound and colonoscopy:     The biopsy/pathology findings from your upper endoscopic ultr

## 2022-02-20 ENCOUNTER — LAB ENCOUNTER (OUTPATIENT)
Dept: LAB | Facility: HOSPITAL | Age: 42
End: 2022-02-20
Attending: INTERNAL MEDICINE
Payer: COMMERCIAL

## 2022-02-20 DIAGNOSIS — Z01.818 PRE-OP TESTING: ICD-10-CM

## 2022-02-21 LAB — SARS-COV-2 RNA RESP QL NAA+PROBE: NOT DETECTED

## 2022-03-31 NOTE — PAT NURSING NOTE
During this PAT call patient states that she and her  took a home Covid test and that they both tested positive for Covid Patient thought the date of the test was 1-  Patient states she had \"a scratchy throat, lots of phlegm, my nose was wet, not runny and I was very tired\". Patient states she is in [place] now and has \" a scratchy throat , but everything is green here and covered with pollen\", so she thinks it is seasonal allergies. ID notified of above and upon further investigation it was noted that the patient also messaged her PCP on 1-. In that message the patient states  she had a  Rapid Covid and PCR test in [facility] on 12-. The Rapid came back negative and the PCR came back positive on 1-3-2022. She then tested  again on 12- and 1-5-2022 and both came back negative. In that message the patient states she was \"mildly symptomatic the entire time\". . She states she then retested on 1- and tested positive. Per ID, patient will need to repeat Covid test before procedure on 4-7-2022 if the 12- Covid test date is correct. Message left with patient to call PAT back in regards to this query.

## 2022-03-31 NOTE — PAT NURSING NOTE
Patient returned  PAT call and clarified that she did have a Rapid Covid with PCR test in [facility] on 12- . Patient states that the Rapid result was negative,but PCR was positive and that she was symptomatic ( see previous note). Per ID guidelines the patient may proceed with her appointment on 4-7-2022 but a pre procedure Covid test is required. ID will update patient's Covid history to 12-. Covid test scheduled. Patient verbalizes understanding and agrees.

## 2022-04-04 ENCOUNTER — LAB ENCOUNTER (OUTPATIENT)
Dept: LAB | Facility: HOSPITAL | Age: 42
End: 2022-04-04
Attending: INTERNAL MEDICINE
Payer: COMMERCIAL

## 2022-04-04 DIAGNOSIS — Z01.818 PRE-OP TESTING: ICD-10-CM

## 2022-04-05 LAB — SARS-COV-2 RNA RESP QL NAA+PROBE: NOT DETECTED

## 2022-04-07 ENCOUNTER — ANESTHESIA (OUTPATIENT)
Dept: ENDOSCOPY | Facility: HOSPITAL | Age: 42
End: 2022-04-07
Payer: COMMERCIAL

## 2022-04-07 ENCOUNTER — HOSPITAL ENCOUNTER (OUTPATIENT)
Facility: HOSPITAL | Age: 42
Setting detail: HOSPITAL OUTPATIENT SURGERY
Discharge: HOME | End: 2022-04-07
Attending: INTERNAL MEDICINE | Admitting: INTERNAL MEDICINE
Payer: COMMERCIAL

## 2022-04-07 ENCOUNTER — ANESTHESIA EVENT (OUTPATIENT)
Dept: ENDOSCOPY | Facility: HOSPITAL | Age: 42
End: 2022-04-07
Payer: COMMERCIAL

## 2022-04-07 DIAGNOSIS — Z01.818 PRE-OP TESTING: Primary | ICD-10-CM

## 2022-04-07 DIAGNOSIS — Z80.0 FAMILY HISTORY OF PANCREATIC CANCER: ICD-10-CM

## 2022-04-07 DIAGNOSIS — Z15.09 BRCA2 POSITIVE: ICD-10-CM

## 2022-04-07 DIAGNOSIS — K63.5 POLYP OF ASCENDING COLON: ICD-10-CM

## 2022-04-07 DIAGNOSIS — K64.9 HEMORRHOIDS: ICD-10-CM

## 2022-04-07 DIAGNOSIS — Z15.01 BRCA2 POSITIVE: ICD-10-CM

## 2022-04-07 LAB — B-HCG UR QL: NEGATIVE

## 2022-04-07 PROCEDURE — 0DBK8ZX EXCISION OF ASCENDING COLON, VIA NATURAL OR ARTIFICIAL OPENING ENDOSCOPIC, DIAGNOSTIC: ICD-10-PCS | Performed by: INTERNAL MEDICINE

## 2022-04-07 PROCEDURE — 81025 URINE PREGNANCY TEST: CPT

## 2022-04-07 PROCEDURE — BD47ZZZ ULTRASONOGRAPHY OF GASTROINTESTINAL TRACT: ICD-10-PCS | Performed by: INTERNAL MEDICINE

## 2022-04-07 PROCEDURE — 88305 TISSUE EXAM BY PATHOLOGIST: CPT | Performed by: INTERNAL MEDICINE

## 2022-04-07 PROCEDURE — 36415 COLL VENOUS BLD VENIPUNCTURE: CPT | Performed by: INTERNAL MEDICINE

## 2022-04-07 PROCEDURE — 0DJ08ZZ INSPECTION OF UPPER INTESTINAL TRACT, VIA NATURAL OR ARTIFICIAL OPENING ENDOSCOPIC: ICD-10-PCS | Performed by: INTERNAL MEDICINE

## 2022-04-07 RX ORDER — SODIUM CHLORIDE, SODIUM LACTATE, POTASSIUM CHLORIDE, CALCIUM CHLORIDE 600; 310; 30; 20 MG/100ML; MG/100ML; MG/100ML; MG/100ML
INJECTION, SOLUTION INTRAVENOUS CONTINUOUS
Status: DISCONTINUED | OUTPATIENT
Start: 2022-04-07 | End: 2022-04-07

## 2022-04-07 RX ORDER — LIDOCAINE HYDROCHLORIDE 10 MG/ML
INJECTION, SOLUTION EPIDURAL; INFILTRATION; INTRACAUDAL; PERINEURAL AS NEEDED
Status: DISCONTINUED | OUTPATIENT
Start: 2022-04-07 | End: 2022-04-07 | Stop reason: SURG

## 2022-04-07 RX ADMIN — LIDOCAINE HYDROCHLORIDE 50 MG: 10 INJECTION, SOLUTION EPIDURAL; INFILTRATION; INTRACAUDAL; PERINEURAL at 12:16:00

## 2022-04-07 RX ADMIN — SODIUM CHLORIDE, SODIUM LACTATE, POTASSIUM CHLORIDE, CALCIUM CHLORIDE: 600; 310; 30; 20 INJECTION, SOLUTION INTRAVENOUS at 12:14:00

## 2022-04-07 RX ADMIN — SODIUM CHLORIDE, SODIUM LACTATE, POTASSIUM CHLORIDE, CALCIUM CHLORIDE: 600; 310; 30; 20 INJECTION, SOLUTION INTRAVENOUS at 12:46:00

## 2022-04-07 NOTE — ANESTHESIA POSTPROCEDURE EVALUATION
Patient: Lawrence Smith    Procedure Summary     Date: 04/07/22 Room / Location: [facility] ENDOSCOPY 01 / [facility] ENDOSCOPY    Anesthesia Start: 9168 Anesthesia Stop:     Procedures:       COLONOSCOPY (N/A )      ENDOSCOPIC ULTRASOUND (EUS) (N/A ) Diagnosis:       Family history of pancreatic cancer      BRCA2 positive      (normal endoscopic ultrasound of the pancreas, colon polyp, hemorrhoids)    Surgeons: Mariela Vale MD Anesthesiologist: Duy Bernard CRNA    Anesthesia Type: MAC ASA Status: 2          Anesthesia Type: MAC    Vitals Value Taken Time   BP 95/59 04/07/22 1253   Temp  04/07/22 1254   Pulse 78 04/07/22 1253   Resp 20 04/07/22 1253   SpO2 95 % 04/07/22 1253       [facility] AN Post Evaluation:   Patient Evaluated in PACU  Patient Participation: complete - patient participated  Level of Consciousness: awake  Pain Score: 0  Pain Management: adequate  Airway Patency:patent  Dental exam unchanged from preop  Yes    Cardiovascular Status: acceptable and hemodynamically stable  Respiratory Status: acceptable, room air, nonlabored ventilation and spontaneous ventilation  Postoperative Hydration acceptable      Jessi Phelan CRNA  4/7/2022 12:54 PM

## 2022-04-08 VITALS
HEIGHT: 65 IN | RESPIRATION RATE: 23 BRPM | HEART RATE: 99 BPM | BODY MASS INDEX: 34.99 KG/M2 | OXYGEN SATURATION: 90 % | SYSTOLIC BLOOD PRESSURE: 102 MMHG | DIASTOLIC BLOOD PRESSURE: 72 MMHG | WEIGHT: 210 LBS

## 2022-04-08 NOTE — PROGRESS NOTES
Recall: colonoscopy in 2 years    4/8/2022  Rosalind Ball  [facility] [address]    Dear Cesar Arboleda,       Here are the results of your recent Upper Endoscopic Ultrasound and Colonoscopy    Normal endoscopic ultrasound of your pancreas. No abnormality was noted on exam.    The previous large polyp removal site in your colon has healed and the biopsy from this site came back negative. Follow-up information: 1. Please complete MRI, due October 2022. Please call [phone] to schedule this. 2. Repeat Colonoscopy due in 2 years        If you need any further assistance, please feel free to call [phone]. Thank you for letting us care for you . Sincerely ,     Maddy Vidal M.D.   [place] [address] of Gastroenterology  [phone]

## 2023-06-15 ENCOUNTER — ANESTHESIA EVENT (OUTPATIENT)
Dept: ENDOSCOPY | Facility: HOSPITAL | Age: 43
End: 2023-06-15
Payer: COMMERCIAL

## 2023-06-15 ENCOUNTER — HOSPITAL ENCOUNTER (OUTPATIENT)
Facility: HOSPITAL | Age: 43
Setting detail: HOSPITAL OUTPATIENT SURGERY
Discharge: HOME | End: 2023-06-15
Attending: INTERNAL MEDICINE | Admitting: INTERNAL MEDICINE
Payer: COMMERCIAL

## 2023-06-15 ENCOUNTER — ANESTHESIA (OUTPATIENT)
Dept: ENDOSCOPY | Facility: HOSPITAL | Age: 43
End: 2023-06-15
Payer: COMMERCIAL

## 2023-06-15 VITALS
DIASTOLIC BLOOD PRESSURE: 81 MMHG | SYSTOLIC BLOOD PRESSURE: 111 MMHG | RESPIRATION RATE: 19 BRPM | TEMPERATURE: 98 F | WEIGHT: 230 LBS | HEART RATE: 85 BPM | HEIGHT: 65 IN | OXYGEN SATURATION: 94 % | BODY MASS INDEX: 38.32 KG/M2

## 2023-06-15 PROCEDURE — 0FJ48ZZ INSPECTION OF GALLBLADDER, VIA NATURAL OR ARTIFICIAL OPENING ENDOSCOPIC: ICD-10-PCS | Performed by: INTERNAL MEDICINE

## 2023-06-15 PROCEDURE — 0FJG8ZZ INSPECTION OF PANCREAS, VIA NATURAL OR ARTIFICIAL OPENING ENDOSCOPIC: ICD-10-PCS | Performed by: INTERNAL MEDICINE

## 2023-06-15 PROCEDURE — 0DJ08ZZ INSPECTION OF UPPER INTESTINAL TRACT, VIA NATURAL OR ARTIFICIAL OPENING ENDOSCOPIC: ICD-10-PCS | Performed by: INTERNAL MEDICINE

## 2023-06-15 RX ORDER — SODIUM CHLORIDE, SODIUM LACTATE, POTASSIUM CHLORIDE, CALCIUM CHLORIDE 600; 310; 30; 20 MG/100ML; MG/100ML; MG/100ML; MG/100ML
INJECTION, SOLUTION INTRAVENOUS CONTINUOUS
Status: DISCONTINUED | OUTPATIENT
Start: 2023-06-15 | End: 2023-06-15

## 2023-06-15 RX ORDER — LIDOCAINE HYDROCHLORIDE 10 MG/ML
INJECTION, SOLUTION EPIDURAL; INFILTRATION; INTRACAUDAL; PERINEURAL AS NEEDED
Status: DISCONTINUED | OUTPATIENT
Start: 2023-06-15 | End: 2023-06-15 | Stop reason: SURG

## 2023-06-15 RX ORDER — NALOXONE HYDROCHLORIDE 0.4 MG/ML
80 INJECTION, SOLUTION INTRAMUSCULAR; INTRAVENOUS; SUBCUTANEOUS AS NEEDED
Status: DISCONTINUED | OUTPATIENT
Start: 2023-06-15 | End: 2023-06-15

## 2023-06-15 RX ADMIN — LIDOCAINE HYDROCHLORIDE 50 MG: 10 INJECTION, SOLUTION EPIDURAL; INFILTRATION; INTRACAUDAL; PERINEURAL at 13:05:00

## 2023-06-15 NOTE — OPERATIVE REPORT
OPERATIVE REPORT   PATIENT NAME: Rebecca Bryan  MRN: [id number]  DATE OF OPERATION: 6/15/2023  PREOPERATIVE DIAGNOSIS:   BRCA2 gene mutation. Family history of pancreatic cancer. Father at the age of 40, paternal uncle at the age of 48 both were smokers. Paternal grand mother had breast cancer. 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 She underwent prior colonoscopy with endoscopic mucosal resection of multiple serrated polyps. One of the polyps was 3 cm in diameter. Last colonoscopy was April 2022. Without obvious recurrence. POSTOPERATIVE DIAGNOSES:  1. Normal endoscopic ultrasound of the pancreas, and gallbladder. 2. Fatty liver  PROCEDURE PERFORMED: Upper endoscopic ultrasound  SURGEON: Rajwinder Murray MD   MEDICATIONS: None    ANESTHESIA: MAC  CONSENT: Informed and obtained from the patient  SPECIMEN: None  COMPLICATIONS: None immediately apparent  PROCEDURE AND FINDINGS:      After achieving adequate sedation and placing the patient in the left lateral decubitus position the Olympus linear echoendoscope was introduced under direct visualization in the esophagus passed into the stomach and second portion of the duodenum. The pancreatic body and tail were examined from the stomach and appeared unremarkable. The head of the pancreas and the uncinate process were then examined from the second portion of the duodenum as well as the duodenal bulb. The common bile duct and the main pancreatic duct were visualized and appeared unremarkable. The gallbladder was unremarkable. The visualized portion of the right and left lobe of the liver appeared to be hyperechogenic consistent with fatty infiltration. No obvious chronic pancreatitis changes seen throughout the pancreatic gland. The main pancreatic duct and that appeared to be dilated throughout. Normal pancreatic ductal anatomy without obvious pancreas divisum  IMPRESSION:  1.  Overall normal endoscopic ultrasound of the pancreas  RECOMMENDATIONS:  1. Endoscopic ultrasound in 1 year we will continue annually.   2. MRI of the pancreas and 6-month then continue annual screening for pancreatic cancer  Angel Khalil MD

## 2023-06-15 NOTE — DISCHARGE INSTRUCTIONS
You will need endoscopic ultrasound in 1 year and MRI in 6 months and continue annual screening for pancreatic cancer. Home Care Instructions for Colonoscopy and/or Gastroscopy with Sedation    Diet:  - Resume your regular diet as tolerated unless otherwise instructed. - Start with light meals to minimize bloating.  - Do not drink alcohol today. Medication:  - If you have questions about resuming your normal medications, please contact your Primary Care Physician. Activities:  - Take it easy today. Do not return to work today. - Do not drive today. - Do not operate any machinery today (including kitchen equipment). Colonoscopy:  - You may notice some rectal \"spotting\" (a little blood on the toilet tissue) for a day or two after the exam. This is normal.  - If you experience any rectal bleeding (not spotting), persistent tenderness or sharp severe abdominal pains, oral temperature over 100 degrees Fahrenheit, light-headedness or dizziness, or any other problems, contact your doctor. Gastroscopy:  - You may have a sore throat for 2-3 days following the exam. This is normal. Gargling with warm salt water (1/2 tsp salt to 1 glass warm water) or using throat lozenges will help. - If you experience any sharp pain in your neck, abdomen or chest, vomiting of blood, oral temperature over 100 degrees Fahrenheit, light-headedness or dizziness, or any other problems, contact your doctor. **If unable to reach your doctor, please go to the [facility] Emergency Room**    - Your referring physician will receive a full report of your examination.  - If you do not hear from your doctor's office within two weeks of your biopsy, please call them for your results. You may be able to see your laboratory results [facility] between 4 and 7 business days. In some cases, your physician may not have viewed the results before they are released to [address]   If you have questions regarding your results contact the physician who ordered the test/exam by phone or via [address] by choosing \"Ask a Medical Question. \"

## 2024-12-20 RX ORDER — OMEPRAZOLE 40 MG/1
40 CAPSULE, DELAYED RELEASE ORAL DAILY
COMMUNITY

## 2024-12-20 RX ORDER — ESTRADIOL 0.1 MG/D
1 PATCH TRANSDERMAL
COMMUNITY
Start: 2022-05-11

## 2024-12-20 RX ORDER — METOPROLOL SUCCINATE 50 MG/1
50 TABLET, EXTENDED RELEASE ORAL DAILY
COMMUNITY
Start: 2022-04-20

## 2025-01-10 ENCOUNTER — HOSPITAL ENCOUNTER (OUTPATIENT)
Facility: HOSPITAL | Age: 45
Setting detail: HOSPITAL OUTPATIENT SURGERY
Discharge: HOME | End: 2025-01-10
Attending: INTERNAL MEDICINE | Admitting: INTERNAL MEDICINE
Payer: COMMERCIAL

## 2025-01-10 ENCOUNTER — ANESTHESIA (OUTPATIENT)
Dept: ENDOSCOPY | Facility: HOSPITAL | Age: 45
End: 2025-01-10
Payer: COMMERCIAL

## 2025-01-10 ENCOUNTER — ANESTHESIA EVENT (OUTPATIENT)
Dept: ENDOSCOPY | Facility: HOSPITAL | Age: 45
End: 2025-01-10
Payer: COMMERCIAL

## 2025-01-10 VITALS
DIASTOLIC BLOOD PRESSURE: 81 MMHG | BODY MASS INDEX: 38.32 KG/M2 | SYSTOLIC BLOOD PRESSURE: 111 MMHG | HEART RATE: 65 BPM | WEIGHT: 230 LBS | HEIGHT: 65 IN | OXYGEN SATURATION: 99 % | TEMPERATURE: 98 F | RESPIRATION RATE: 18 BRPM

## 2025-01-10 PROCEDURE — BF4CZZZ ULTRASONOGRAPHY OF HEPATOBILIARY SYSTEM, ALL: ICD-10-PCS | Performed by: INTERNAL MEDICINE

## 2025-01-10 PROCEDURE — 0DJ08ZZ INSPECTION OF UPPER INTESTINAL TRACT, VIA NATURAL OR ARTIFICIAL OPENING ENDOSCOPIC: ICD-10-PCS | Performed by: INTERNAL MEDICINE

## 2025-01-10 RX ORDER — LIDOCAINE HYDROCHLORIDE 10 MG/ML
INJECTION, SOLUTION EPIDURAL; INFILTRATION; INTRACAUDAL; PERINEURAL AS NEEDED
Status: DISCONTINUED | OUTPATIENT
Start: 2025-01-10 | End: 2025-01-10 | Stop reason: SURG

## 2025-01-10 RX ORDER — PROCHLORPERAZINE EDISYLATE 5 MG/ML
5 INJECTION INTRAMUSCULAR; INTRAVENOUS EVERY 8 HOURS PRN
Status: DISCONTINUED | OUTPATIENT
Start: 2025-01-10 | End: 2025-01-10

## 2025-01-10 RX ORDER — SODIUM CHLORIDE, SODIUM LACTATE, POTASSIUM CHLORIDE, CALCIUM CHLORIDE 600; 310; 30; 20 MG/100ML; MG/100ML; MG/100ML; MG/100ML
INJECTION, SOLUTION INTRAVENOUS CONTINUOUS
Status: DISCONTINUED | OUTPATIENT
Start: 2025-01-10 | End: 2025-01-10

## 2025-01-10 RX ORDER — ONDANSETRON 2 MG/ML
4 INJECTION INTRAMUSCULAR; INTRAVENOUS EVERY 6 HOURS PRN
Status: DISCONTINUED | OUTPATIENT
Start: 2025-01-10 | End: 2025-01-10

## 2025-01-10 RX ORDER — HYDROMORPHONE HYDROCHLORIDE 1 MG/ML
0.2 INJECTION, SOLUTION INTRAMUSCULAR; INTRAVENOUS; SUBCUTANEOUS EVERY 5 MIN PRN
Status: DISCONTINUED | OUTPATIENT
Start: 2025-01-10 | End: 2025-01-10

## 2025-01-10 RX ORDER — NALOXONE HYDROCHLORIDE 0.4 MG/ML
0.08 INJECTION, SOLUTION INTRAMUSCULAR; INTRAVENOUS; SUBCUTANEOUS AS NEEDED
Status: DISCONTINUED | OUTPATIENT
Start: 2025-01-10 | End: 2025-01-10

## 2025-01-10 RX ORDER — HYDROMORPHONE HYDROCHLORIDE 1 MG/ML
0.4 INJECTION, SOLUTION INTRAMUSCULAR; INTRAVENOUS; SUBCUTANEOUS EVERY 5 MIN PRN
Status: DISCONTINUED | OUTPATIENT
Start: 2025-01-10 | End: 2025-01-10

## 2025-01-10 RX ORDER — HYDROMORPHONE HYDROCHLORIDE 1 MG/ML
0.6 INJECTION, SOLUTION INTRAMUSCULAR; INTRAVENOUS; SUBCUTANEOUS EVERY 5 MIN PRN
Status: DISCONTINUED | OUTPATIENT
Start: 2025-01-10 | End: 2025-01-10

## 2025-01-10 RX ADMIN — SODIUM CHLORIDE, SODIUM LACTATE, POTASSIUM CHLORIDE, CALCIUM CHLORIDE: 600; 310; 30; 20 INJECTION, SOLUTION INTRAVENOUS at 11:47:00

## 2025-01-10 RX ADMIN — LIDOCAINE HYDROCHLORIDE 25 MG: 10 INJECTION, SOLUTION EPIDURAL; INFILTRATION; INTRACAUDAL; PERINEURAL at 11:36:00

## 2025-01-10 NOTE — ANESTHESIA PREPROCEDURE EVALUATION
PRE-OP EVALUATION    Patient Name: Mari Leach    Admit Diagnosis: BRCA GENE MUTATION POSITIVE IN FEMALE, FAMILY HISTORY OF PANCREATIC CANCER    Pre-op Diagnosis: BRCA GENE MUTATION POSITIVE IN FEMALE, FAMILY HISTORY OF PANCREATIC CANCER    ENDOSCOPIC ULTRASOUND (EUS)    Anesthesia Procedure: ENDOSCOPIC ULTRASOUND (EUS)    Surgeons and Role:     * Jeffrey Hodge MD - Primary    Pre-op vitals reviewed.        Body mass index is 38.27 kg/m².    Current medications reviewed.  Hospital Medications:  No current facility-administered medications on file as of 1/10/2025.       Outpatient Medications:   Prescriptions Prior to Admission[1]    Allergies: Epinephrine and Acetaminophen      Anesthesia Evaluation    Patient summary reviewed.    Anesthetic Complications  (+) history of anesthetic complications  History of: PONV       GI/Hepatic/Renal                                 Cardiovascular                (+) obesity  (+) hypertension                                     Endo/Other                                  Pulmonary      (+) asthma              (+) sleep apnea       Neuro/Psych                                      Past Surgical History:   Procedure Laterality Date    Colonoscopy N/A 2/13/2020    Procedure: COLONOSCOPY;  Surgeon: David King MD;  Location: [facility] ENDOSCOPY    Colonoscopy N/A 11/19/2020    Procedure: COLONOSCOPY;  Surgeon: David King MD;  Location: [facility] ENDOSCOPY    Colonoscopy N/A 4/7/2022    Procedure: COLONOSCOPY;  Surgeon: David King MD;  Location: [facility] ENDOSCOPY    Colposcopy, cervix w/upper adjacent vagina; w/biopsy(s), cervix  12/2011    katie 1    Mastectomy left      Mastectomy right      Other surgical history  2000    Rhinoplasty, repair deviated septum,polypec    Other surgical history  4/2017    bilateral mastectomies with nipple sparing - Braca 2     Social History     Socioeconomic History    Marital status:    Tobacco Use    Smoking status: Former     Current  packs/day: 0.00     Average packs/day: 1 pack/day for 9.0 years (9.0 ttl pk-yrs)     Types: Cigarettes     Start date: 2001     Quit date: 2010     Years since quittin.9    Smokeless tobacco: Never   Vaping Use    Vaping status: Former    Substances: THC, last time was 2022   Substance and Sexual Activity    Alcohol use: No     Alcohol/week: 0.0 standard drinks of alcohol     Comment: socially, rarely, 2x a year    Drug use: Yes     Types: Cannabis     Comment: gummies every other day    Sexual activity: Yes     Partners: Male     History   Drug Use    Types: Cannabis     Comment: gummies every other day     Available pre-op labs reviewed.               Airway      Mallampati: III  Mouth opening: <3 FB  TM distance: < 4 cm  Neck ROM: limited Cardiovascular    Cardiovascular exam normal.  Rhythm: regular  Rate: normal     Dental             Pulmonary    Pulmonary exam normal.                 Other findings              ASA: 3   Plan: MAC  NPO status verified and patient meets guidelines.          Plan/risks discussed with: patient and significant other                Present on Admission:  **None**             [1]   Medications Prior to Admission   Medication Sig Dispense Refill Last Dose/Taking    metoprolol succinate ER 50 MG Oral Tablet 24 Hr Take 1 tablet (50 mg total) by mouth daily.   Taking    estradiol 0.1 MG/24HR Transdermal Patch Weekly Place 1 patch onto the skin twice a week.   Taking    Omeprazole 40 MG Oral Capsule Delayed Release Take 1 capsule (40 mg total) by mouth daily.   Taking    sertraline 100 MG Oral Tab Take 1.5 tablets (150 mg total) by mouth daily. 135 tablet 1 Taking    traZODone 50 MG Oral Tab Take 1 tablet (50 mg total) by mouth nightly. 90 tablet 1 Taking    albuterol 108 (90 Base) MCG/ACT Inhalation Aero Soln Inhale 2 puffs into the lungs every 6 (six) hours as needed for Wheezing. 8.5 g 0 Taking As Needed    Beclomethasone Dipropionate (QVAR) 80 MCG/ACT Inhalation Aero  Soln Inhale 2 puffs into the lungs daily. 1 Inhaler 5 Taking    LORazepam (ATIVAN) 0.5 MG Oral Tab Take 1 tablet (0.5 mg total) by mouth 3 (three) times daily as needed for Anxiety. 20 tablet 1 Taking As Needed    Probiotic Product (PROBIOTIC FORMULA OR) Take by mouth.     Taking    Calcipotriene-Betameth Diprop 0.005-0.064 % External Foam Apply 1 Application topically daily.   Taking    Fluticasone Propionate 50 MCG/ACT Nasal Suspension 2 sprays by Nasal route daily. 1 Inhaler 12 Taking    Multiple Vitamins-Minerals (MULTIVITAMIN OR) Take 1 tablet by mouth daily.     Taking    Cholecalciferol (VITAMIN D) 1000 UNIT Oral Cap 2,000 Units daily.   Taking

## 2025-01-10 NOTE — DISCHARGE INSTRUCTIONS

## 2025-01-10 NOTE — OPERATIVE REPORT
[facility] OPERATIVE REPORT   PATIENT NAME: Mari Leach  MRN: [id number]  DATE OF OPERATION: 1/10/2025  PREOPERATIVE DIAGNOSIS: BRCA mutation; father with pancreatic cancer at age 37 yr; here for screening for pancreatic cancer  POSTOPERATIVE DIAGNOSIS:    1.  Pancreatic steatosis   PROCEDURE PERFORMED: upper endoscopy/ ENDOSCOPIC ultrasound  SEDATION MEDICATIONS: MAC  PREOPERATIVE MEDICATIONS:   PREPROCEDURE ASSESSMENT: The indication for this procedure is to assess for mass. The patient was identified by myself and nursing staff in the exam room. Informed consent was obtained. The patient was seen in clinic and a full H&P was obtained. On brief physical examination, airway is patent. Chest is clear. Heart has regular rate and rhythm. Abdomen is soft, nontender with good bowel sounds. A medication list was taken by nursing today and reviewed by myself. The patient is an ASA grade 2.   PROCEDURE NOTE: The procedure was completed without difficulty. The patient tolerated the procedure well.   Upper endoscopy (EGD):  The endoscope was inserted through the mouth and advanced to the level of the duodenum, 3rd portion.  Visualized portion of the esophagus, stomach including antrum, body, fundus and cardiac, and duodenum were normal.  Endoscopic ultrasound (EUS):  Endoscopic ultrasound was performed using the linear echoendoscope.  Images were obtained.    LIVER: Left lobe of the liver was visualized and no mass or lesions seen.  No intrahepatic duct dilation was noted.  Portal vein was noted to come out of the liver and the portal confluence was seen and pancreas was noted.   PANCREAS:  Pancreatic neck, body, and tail were interrogated from the gastric body while the neck, head and uncinate were examined from the 1st and 2nd duodenum.  PD normal  Pancreas divisum:  no  Chronic pancreatitis changes: no  Neoplasm: no   Cysts:   no  Biliary Tree: normal  - stones:  no  GALLBLADDER:  visualized and was normal  without stones or sludge  CELIAC AXIS:  visualized without lymphadenopathy  L ADRENAL GLAND:  visualized   L KIDNEY:  visualized   MEDIASTINUM:  visualized without lymphadenopathy  Scope was withdrawn from the patient and patient tolerated the procedure well.  FINDINGS   Normal   RECOMMENDATIONS: alternating EUS and MRI every 6 months; biannual HBA1c to be ordered by PCP- increase may suggest early pancreatic neoplasm  DISCHARGE:  On discharge, the patient was given an after-visit summary detailing the procedure, findings, followup plans, and an updated medication list.     Thank you very much for the consultation.  I really appreciate it.    Jeffrey Hodge MD

## 2025-01-10 NOTE — H&P
History & Physical Examination    Patient Name: Mari Leach  MRN: [id number]  CSN: [id number]  YOB: 1980    Diagnosis: BRCA GENE MUTATION POSITIVE IN FEMALE, FAMILY HISTORY OF PANCREATIC CANCER       Present Illness:  Mari Leach is a 44 year old female is here BRCA GENE MUTATION POSITIVE IN FEMALE, FAMILY HISTORY OF PANCREATIC CANCER.    Body mass index is 38.27 kg/m².    Past Medical History:    Allergic rhinitis    Anesthesia complication    Anxiety state, unspecified    Asthma (HCC)    ASTHMA NOS W/O STATUS ASTHMATICUS    Cardiomyopathy (HCC)    with preganancy 2013 and since then it has resolved    Cardiomyopathy, peripartum (HCC)    CERVICAL DYSPLASIA    ascus +hpv    Hearing impairment    bilateral hearing aids    High blood pressure    History of stomach ulcers    stomach ulcer    Hx of motion sickness    Migraines    Obesity    Peripartum cardiomyopathy (HCC)    PONV (postoperative nausea and vomiting)    Visual impairment    contacts       Procedure: EUS    Physician Pre-Sedation Assessment    Pre-Sedation Assessment:    Sedation History: Airway Assessed    ASA Classification: 2. Patient with mild systemic disease    Cardiac:    Respiratory:    Abdomen:      Plan: MAC        Current Facility-Administered Medications   Medication Dose Route Frequency    lactated ringers infusion   Intravenous Continuous       Allergies: Allergies[1]    Past Surgical History:   Procedure Laterality Date    Colonoscopy N/A 2/13/2020    Procedure: COLONOSCOPY;  Surgeon: David King MD;  Location: [facility] ENDOSCOPY    Colonoscopy N/A 11/19/2020    Procedure: COLONOSCOPY;  Surgeon: David King MD;  Location: [facility] ENDOSCOPY    Colonoscopy N/A 4/7/2022    Procedure: COLONOSCOPY;  Surgeon: David King MD;  Location: [facility] ENDOSCOPY    Colposcopy, cervix w/upper adjacent vagina; w/biopsy(s), cervix  12/2011    katie 1    Mastectomy left      Mastectomy right      Other surgical history  2000     Rhinoplasty, repair deviated septum,polypec    Other surgical history  2017    bilateral mastectomies with nipple sparing - Braca 2     Family History   Problem Relation Age of Onset    Cancer Father         Pacreatic    Cancer Paternal Uncle         pancreas    Cancer Paternal Cousin Male         brain    Cancer Paternal Grandmother         breast    Breast Cancer Paternal Grandmother 38     Social History     Tobacco Use    Smoking status: Former     Current packs/day: 0.00     Average packs/day: 1 pack/day for 9.0 years (9.0 ttl pk-yrs)     Types: Cigarettes     Start date: 2001     Quit date: 2010     Years since quittin.9    Smokeless tobacco: Never   Substance Use Topics    Alcohol use: No     Alcohol/week: 0.0 standard drinks of alcohol     Comment: socially, rarely, 2x a year       SYSTEM Check if Review is Normal Check if Physical Exam is Normal If not normal, please explain:   HEENT [x ] [x ]    NECK & BACK [x ] [x ]    HEART [x ] [x ]    LUNGS [x ] [x ]    ABDOMEN [x ] [x ]    UROGENITAL [ ] [ ]    EXTREMITIES [ ] [ ]    OTHER        [ x ] I have discussed the risks and benefits and alternatives with the patient/family.  They understand and agree to proceed with plan of care.  [ x ] I have reviewed the History and Physical done within the last 30 days.  Any changes noted above.    Jeffrey Hodge MD  1/10/2025  11:26 AM             [1]   Allergies  Allergen Reactions    Epinephrine NAUSEA AND VOMITING     Sweating,shaking. Was given locally for a breast biopsy.     Acetaminophen      Muscle aches

## 2025-01-10 NOTE — ANESTHESIA POSTPROCEDURE EVALUATION
[facility]    Mari Leach Patient Status:  Hospital Outpatient Surgery   Age/Gender 44 year old female MRN [id number]   Location [facility] ENDOSCOPY PAIN [facility] Attending Jeffrey Hodge MD   Hosp Day # 0 PCP Kaya Shea MD       Anesthesia Post-op Note    ENDOSCOPIC ULTRASOUND (EUS)    Procedure Summary       Date: 01/10/25 Room / Location:  ENDOSCOPY 03 / [facility] ENDOSCOPY    Anesthesia Start: 1135 Anesthesia Stop: 1147    Procedure: ENDOSCOPIC ULTRASOUND (EUS) Diagnosis: (NORMAL EUS)    Surgeons: Jeffrey Hodge MD Anesthesiologist: Frankie Salazar MD    Anesthesia Type: MAC ASA Status: 3            Anesthesia Type: MAC    Vitals Value Taken Time   BP 93/57 01/10/25 1147   Temp 98.3 °F (36.8 °C) 01/10/25 1147   Pulse 75 01/10/25 1147   Resp 16 01/10/25 1147   SpO2 96 % 01/10/25 1147           Patient Location: Endoscopy    Anesthesia Type: MAC    Airway Patency: patent    Postop Pain Control: adequate    Mental Status: mildly sedated but able to meaningfully participate in the post-anesthesia evaluation    Nausea/Vomiting: none    Cardiopulmonary/Hydration status: stable euvolemic    Complications: no apparent anesthesia related complications    Postop vital signs: stable    Dental Exam: Unchanged from Preop    Patient to be discharged home when criteria met.

## (undated) DEVICE — SUTURE PDS II 2-0 SH

## (undated) DEVICE — LINE MNTR ADLT SET O2 INTMD

## (undated) DEVICE — MARKER SKIN 2 TIP

## (undated) DEVICE — SNARE 9MM 230CM 2.4MM EXACTO

## (undated) DEVICE — DRAIN RELIAVAC 100CC

## (undated) DEVICE — SUTURE MONOCRYL 3-0 PS-2

## (undated) DEVICE — GLOVE ORTHO ALOETOUCH SZ 6-1/2

## (undated) DEVICE — Device: Brand: CUSTOM PROCEDURE KIT

## (undated) DEVICE — DURACLIP 11MM 235CM

## (undated) DEVICE — SYRINGE 50ML LL TIP

## (undated) DEVICE — DISPOSABLE DISTAL ATTACHMENT: Brand: DISPOSABLE DISTAL ATTACHMENT

## (undated) DEVICE — #15 STERILE STAINLESS BLADE: Brand: STERILE STAINLESS BLADES

## (undated) DEVICE — CANNULA NASAL 02/C02 ADULT

## (undated) DEVICE — GLOVE,SURG,SENSICARE,ALOE,LF,PF,7: Brand: MEDLINE

## (undated) DEVICE — Device: Brand: DEFENDO AIR/WATER/SUCTION AND BIOPSY VALVE

## (undated) DEVICE — Device: Brand: DUAL NARE NASAL CANNULAE FEMALE LUER CON 7FT O2 TUBE

## (undated) DEVICE — 3M™ RED DOT™ MONITORING ELECTRODE WITH FOAM TAPE AND STICKY GEL, 50/BAG, 20/CASE, 72/PLT 2570: Brand: RED DOT™

## (undated) DEVICE — STOPCOCK IV 3W STPCK HFLO

## (undated) DEVICE — DRAIN BLAKE ROUND 15FR

## (undated) DEVICE — FORCEP RADIAL JAW 4

## (undated) DEVICE — 3M™ TEGADERM™ TRANSPARENT FILM DRESSING, 1626W, 4 IN X 4-3/4 IN (10 CM X 12 CM), 50 EACH/CARTON, 4 CARTON/CASE: Brand: 3M™ TEGADERM™

## (undated) DEVICE — STANDARD HYPODERMIC NEEDLE,POLYPROPYLENE HUB: Brand: MONOJECT

## (undated) DEVICE — #10 STERILE BLADE: Brand: POLYMER COATED BLADES

## (undated) DEVICE — 10FT COMBINED O2 DELIVERY/CO2 MONITORING. FILTER WITH MICROSTREAM TYPE LUER: Brand: DUAL ADULT NASAL CANNULA

## (undated) DEVICE — KIT CUSTOM ENDOPROCEDURE STERIS

## (undated) DEVICE — NON-ADHERENT PAD PREPACK: Brand: TELFA

## (undated) DEVICE — MEDI-VAC NON-CONDUCTIVE SUCTION TUBING 6MM X 1.8M (6FT.) L: Brand: CARDINAL HEALTH

## (undated) DEVICE — BANDAGE ROLL,100% COTTON, 6 PLY, LARGE: Brand: KERLIX

## (undated) DEVICE — DURACLIP 16MM 235CM

## (undated) DEVICE — ORISE GEL

## (undated) DEVICE — 60 ML SYRINGE REGULAR TIP: Brand: MONOJECT

## (undated) DEVICE — CHLORAPREP 26ML APPLICATOR

## (undated) DEVICE — Device

## (undated) DEVICE — Device: Brand: PLUMEPEN

## (undated) DEVICE — SUTURE MONOCRYL 4-0 PS-2

## (undated) DEVICE — BALLOON HEMOSTATIC EUS LINEAR

## (undated) DEVICE — SNARE ENDOSCOPIC 10MM ROUND

## (undated) DEVICE — SYRINGE 10ML LL CONTRL SYRINGE

## (undated) DEVICE — SUTURE ETHILON 3-0 FSL

## (undated) DEVICE — CAUTERY BLADE 2IN INS E1455

## (undated) DEVICE — SOL  .9 1000ML BTL

## (undated) DEVICE — DRAPE HALF 40X58 DYNJP2410

## (undated) DEVICE — PLASTIC BREAST CDS-LF: Brand: MEDLINE INDUSTRIES, INC.

## (undated) DEVICE — GLOVE BIOGEL M SURG SZ 6-1/2

## (undated) DEVICE — SNARE OPTMZ PLPCTM TRP

## (undated) DEVICE — CONMED SCOPE SAVER BITE BLOCK, 20X27 MM: Brand: SCOPE SAVER

## (undated) DEVICE — KENDALL SCD EXPRESS SLEEVES, KNEE LENGTH, MEDIUM: Brand: KENDALL SCD

## (undated) DEVICE — GOWN,SIRUS,FABRIC-REINFORCED,LARGE: Brand: MEDLINE

## (undated) DEVICE — DRESSING BIOPATCH 1X4 CNTR

## (undated) DEVICE — KIT ENDO ORCAPOD 160/180/190

## (undated) DEVICE — KIT CLEAN ENDOKIT 1.1OZ GOWNX2

## (undated) DEVICE — 3M™ IOBAN™ 2 ANTIMICROBIAL INCISE DRAPE 6650EZ: Brand: IOBAN™ 2

## (undated) DEVICE — V2 SPECIMEN COLLECTION MANIFOLD KIT: Brand: NEPTUNE

## (undated) DEVICE — 1200CC GUARDIAN II: Brand: GUARDIAN

## (undated) DEVICE — DRAIN CHANNEL 19FR BLAKE

## (undated) DEVICE — SUPER SPONGES,MEDIUM: Brand: KERLIX

## (undated) DEVICE — NEEDLE SPINAL 25X3-1/2 BLUE

## (undated) DEVICE — PROXIMATE RH ROTATING HEAD SKIN STAPLERS (35 WIDE) CONTAINS 35 STAINLESS STEEL STAPLES: Brand: PROXIMATE

## (undated) DEVICE — BULB SYRINGE,IRRIGATION WITH PROTECTIVE CAP: Brand: DOVER

## (undated) DEVICE — SUTURE PLAIN GUT 5-0 PC-1

## (undated) DEVICE — BREAST-HERNIA-PORT CDS-LF: Brand: MEDLINE INDUSTRIES, INC.

## (undated) DEVICE — LAPAROTOMY SPONGE - RF AND X-RAY DETECTABLE PRE-WASHED: Brand: SITUATE

## (undated) DEVICE — DALE POST SURGICAL BRA, X-LARGE, FITS B-D, 38"-44", 1 PER BOX.: Brand: DALE POST SURGICAL BRA

## (undated) DEVICE — CSTM UNIVERSAL DRAPE PK: Brand: MEDLINE INDUSTRIES, INC.

## (undated) DEVICE — DERMABOND LIQUID ADHESIVE

## (undated) NOTE — LETTER
[facility]  Administration of Anesthesia  1. I, Marcy Aguilar, or _________________________________ acting on her behalf, (Patient) (Dependent/Representative) request to receive anesthesia for my pending procedure/operation/treatment. A physician (anesthesiologist) alone or an anesthesiologist working with a nurse anesthetist may administer my anesthesia. 2. I understand that my anesthesiologist is not an employee or agent of the hospital, but is an independent medical practitioner who has been permitted to use its facilities for the care and treatment of his/her patients. 3. I acknowledge that a physician from [facility] [address] [facility] or their designate(s), recommended anesthesia for me using her/his medical judgment. The type(s) of anesthesia I may receive include:                a) General Anesthesia, b) Spinal/Epidural Anesthesia, c) Regional Anesthesia or d) Monitored Anesthesia Care. 4. If my spinal, regional or monitored anesthesia care (local) is not satisfactory for my comfort, or if my medical condition requires, I consent to the administration of general anesthesia. 5. I am aware that the practice of anesthesiology is not an exact science and that some foreseeable risks or consequences may occur. Some common risks/consequences include sore throat and hoarseness, nausea and vomiting, muscle soreness, backache, damage to the mouth/teeth/vocal cords and eye injury. I understand that more rare but serious potential risks of anesthesia include blood pressure changes, drug reactions, cardiac arrest, brain damage, paralysis or death. These risks apply to whether I have general, spinal/epidural, regional or monitored anesthesia care. 6. OBSTETRIC PATIENTS: Specific risks/consequences of spinal/epidural anesthesia may include itching, low blood pressure, difficulty urinating, slowing of the baby's heart rate and headache.  Rare risks include infections, high spinal block, spinal bleeding, seizure, cardiac arrest and death. 7. AWARENESS: I understand that it is possible (but unlikely) to have explicit memory of events from the operating room while under general anesthesia. 8. ELECTROCONVULSIVE THERAPY PATIENTS: This consent serves for all treatments in a single course of therapy. 9. I understand that I must inform my anesthesiologist when I last ate and/or drank to minimize the risk of anesthesia. 10. If I am pregnant, or may pregnant, I understand that elective surgery should be postponed until after the baby is born. Anesthetics cross the placenta and may temporarily anesthetize the baby. Although fetal complications of anesthesia during pregnancy are rare, they may include birth defects, premature labor, brain damage and death. 11. I certify that I informed the anesthesiologist, to the best of my ability, about medication I take including blood thinners, anticoagulants, herbal remedies, narcotics and recreational drugs (e.g. cocaine, marijuana, PCP). Failure to inform my anesthesiologist about these medicines may increase my risk of anesthetic complications. The nature and purpose of my anesthetic management was explained to me. I had the opportunity to ask questions and the answers and information provided meet my satisfaction.   I retain the right to withdraw this consent at any time prior to the administration of said anesthetic.    ___________________________________________________           _____________________________________________________  Patient Signature                                                                                      Witness Signature                ___________________________________________________           _____________________________________________________  Date/Time                                                                                               Responsible person in case of minor/ unconscious pt /Relationship    My signature below affirms that prior to the time of the procedure, I have explained to the patient and/or his/her guardian, the risks and benefits of undergoing anesthesia, as well as any reasonable alternatives.     ___________________________________________________            _____________________________________________________  Physician Signature                            Date/Time  Patient Name: Lawrence Smith     : 1980     Printed: 2022      Medical Record #: [id number]                              Page 1 of 1    ----------ANESTHESIA CONSENT----------

## (undated) NOTE — LETTER
[address]  Authorization for Surgical Operation and Procedure                                                                                           I hereby authorize Ramon Liz MD, my physician and his/her assistants (if applicable), which may include medical students, residents, and/or fellows, to perform the following surgical operation/ procedure and administer such anesthesia as may be determined necessary by my physician: Operation/Procedure name (s) ENDOSCOPIC ULTRASOUND (EUS) upper with posible fine needle  aspiration on [place]nn Part   2. I recognize that during the surgical operation/procedure, unforeseen conditions may necessitate additional or different procedures than those listed above. I, therefore, further authorize and request that the above-named surgeon, assistants, or designees perform such procedures as are, in their judgment, necessary and desirable. 3.   My surgeon/physician has discussed prior to my surgery the potential benefits, risks and side effects of this procedure; the likelihood of achieving goals; and potential problems that might occur during recuperation. They also discussed reasonable alternatives to the procedure, including risks, benefits, and side effects related to the alternatives and risks related to not receiving this procedure. I have had all my questions answered and I acknowledge that no guarantee has been made as to the result that may be obtained. 4.   Should the need arise during my operation/procedure, which includes change of level of care prior to discharge, I also consent to the administration of blood and/or blood products. Further, I understand that despite careful testing and screening of blood or blood products by collecting agencies, I may still be subject to ill effects as a result of receiving a blood transfusion and/or blood products.   The following are some, but not all, of the potential risks that can occur: fever and allergic reactions, hemolytic reactions, transmission of diseases such as Hepatitis, AIDS and Cytomegalovirus (CMV) and fluid overload. In the event that I wish to have an autologous transfusion of my own blood, or a directed donor transfusion, I will discuss this with my physician. Check only if Refusing Blood or Blood Products  I understand refusal of blood or blood products as deemed necessary by my physician may have serious consequences to my condition to include possible death. I hereby assume responsibility for my refusal and release the hospital, its personnel, and my physicians from any responsibility for the consequences of my refusal.    o  Refuse   5. I authorize the use of any specimen, organs, tissues, body parts or foreign objects that may be removed from my body during the operation/procedure for diagnosis, research or teaching purposes and their subsequent disposal by hospital authorities. I also authorize the release of specimen test results and/or written reports to my treating physician on the hospital medical staff or other referring or consulting physicians involved in my care, at the discretion of the Pathologist or my treating physician. 6.   I consent to the photographing or videotaping of the operations or procedures to be performed, including appropriate portions of my body for medical, scientific, or educational purposes, provided my identity is not revealed by the pictures or by descriptive texts accompanying them. If the procedure has been photographed/videotaped, the surgeon will obtain the original picture, image, videotape or CD. The hospital will not be responsible for storage, release or maintenance of the picture, image, tape or CD.    7.   I consent to the presence of a  or observers in the operating room as deemed necessary by my physician or their designees.     8.   I recognize that in the event my procedure results in extended X-Ray/fluoroscopy time, I may develop a skin reaction. 9. If I have a Do Not Attempt Resuscitation (DNAR) order in place, that status will be suspended while in the operating room, procedural suite, and during the recovery period unless otherwise explicitly stated by me (or a person authorized to consent on my behalf). The surgeon or my attending physician will determine when the applicable recovery period ends for purposes of reinstating the DNAR order. 10. Patients having a sterilization procedure: I understand that if the procedure is successful the results will be permanent and it will therefore be impossible for me to inseminate, conceive, or bear children. I also understand that the procedure is intended to result in sterility, although the result has not been guaranteed. 11. I acknowledge that my physician has explained sedation/analgesia administration to me including the risk and benefits I consent to the administration of sedation/analgesia as may be necessary or desirable in the judgment of my physician. I CERTIFY THAT I HAVE READ AND FULLY UNDERSTAND THE ABOVE CONSENT TO OPERATION and/or OTHER PROCEDURE.     _________________________________________ _________________________________     ___________________________________  Signature of Patient     Signature of Responsible Person                   Printed Name of Responsible Person                              _________________________________________ ______________________________        ___________________________________  Signature of Witness         Date  Time         Relationship to Patient    STATEMENT OF PHYSICIAN My signature below affirms that prior to the time of the procedure; I have explained to the patient and/or his/her legal representative, the risks and benefits involved in the proposed treatment and any reasonable alternative to the proposed treatment.  I have also explained the risks and benefits involved in refusal of the proposed treatment and alternatives to the proposed treatment and have answered the patient's questions.  If I have a significant financial interest in a co-management agreement or a significant financial interest in any product or implant, or other significant relationship used in this procedure/surgery, I have disclosed this and had a discussion with my patient.     _______________________________________________________________ _____________________________  Leyva Barn of Physician)                                                                                         (Date)                                   (Time)  Patient Name: Camila García    : 1980   Printed: 2023      Medical Record #: [id number]                                              Page 1 of 1

## (undated) NOTE — LETTER
[address], [place]  Authorization for Invasive Procedures  1. I hereby authorize Dr. Dinh Mast , my physician and whomever may be designated as the doctor's assistant, to perform the following operation and/or procedure:  Endoscopic Ultrasound and Colonoscopy on [facility].    2. My physician has explained to me the nature and purpose of the operation or other procedure, possible alternative methods of treatment, the risks involved and the possibility of complications to me. I understand the probable consequences of declining the recommended procedure and the alternative methods of treatment. I acknowledge that no guarantee has been made as to the result that may be obtained. 3. I recognize that during the course of this operation or other procedure, unforeseen conditions may necessitate additional or different procedures than those listed above. I, therefore, further authorize and request that the above-named physician, his/her physician assistants, or designees perform such procedures as are, in his/her professional opinion, necessary and desirable. If I have a Do Not Attempt Resuscitation (DNAR) order in place, that status will be suspended while in the operating room, procedural suite, and during the recovery period unless otherwise explicitly stated by me (or a person authorized to consent on my behalf). The surgeon or my attending physician will determine when the applicable recovery period ends for purposes of reinstating the DNAR order. 4. Should the need arise during my operation or immediate post-operative period; I also consent to the administration of blood and/or blood products.  Further, I understand that despite careful testing and screening of blood and blood products, I may still be subject to ill effects as a result of recieving a blood transfusion an/or blood producst. The following are some, but not all, of the potential risks that can occur: fever and allergic reactions, hemolytic reactions, transmission of disease such as hepatitis, AIDS, cytomegalovirus (CMV), and flluid overload. In the event that I wish to have autologous transfusions of my own blood, or a directed donor transfusion, I will discuss this with my physician. 5. I consent to the photographing of the operations or procedures to be performed for the purposes of advancing medicine, science, and/or education, provided my identity is not revealed. If the procedure has been videotaped, the physician/surgeon will obtain the original videotape. [facility] will not be responsible for storage or maintenance of this tape. 6. I consent to the presence of a  or observer as deemed necessary by my physician or his designee. 7. Any tissues or organs removed in the operation or other procedure may be disposed of by and at the discretion of [facility].    8. I understand that the physician and his/her physician assistants may not be employees or agents of [facility], [facility], nor [facility], but are independent medical practitioners who have been permitted to use its facilities for the care and treatment of their patients. 9. Patients having a sterilization procedure: I understand that if the procedure is successful the results will be permanent and it will therefore be impossible for me to inseminate, conceive or bear children. I also understand that the procedure is intended to result in sterility, although the result has not been guaranteed. 10. I CERTIFY THAT I HAVE READ AND FULLY UNDERSTAND THE ABOVE CONSENT TO OPERATION and/or OTHER PROCEDURE. 11. I acknowledge that my physician has explained sedation/analgesia administration to me including the risks and benefits.  I consent to the administration of sedation/analgesia as may be necessary or desirable in the judgment of my physician. Signature of Patient:  ________________________________________________ Date: _________Time: _________    Responsible person in case of minor or unconscious: _____________________________Relationship: ____________     Witness Signature: ____________________________________________ Date: __________ Time: ___________    Statement of Physician  My signature below affirms that prior to the time of the procedure, I have explained to the patient and/or her legal representative, the risks and benefits involved in the proposed treatment and any reasonable alternative to the proposed treatment. I have also explained the risks and benefits involved in the refusal of the proposed treatment and have answered the patient's questions. If I have a significant financial interest in this procedure/surgery, I have disclosed this and had a discussion with my patient.     Signature of Physician:   ________________________________________Date: _________Time:_______ Patient Name: Melissa Tang  : 1980   Printed: 2022    Medical Record #: [id number]

## (undated) NOTE — IP AVS SNAPSHOT
[facility] [address] [place], [address] ~ [phone]                Discharge Summary   4/6/2017    Vj Cohen           Admission Information        Provider Department    4/6/2017 Larissa Wolfe MD  [id number] [facility]ezing. Lorenza Rose                    Continue your home routine       Beclomethasone Dipropionate 80 MCG/ACT Aers   Commonly known as:  QVAR        Inhale 2 puffs (0.16 mg total) into the lungs 2 (two) times daily.     Lorenza Rose - Cefadroxil 500 MG Caps  - OxyCODONE HCl 5 MG Tabs              Patient Instructions       What care is needed @ home? Take all medications as ordered by your doctors. Keep surgical sites clean & dry. Wear your surgical bra @ all times.     Bandages Follow up with [facility], MD In 1 week.     Specialty:  SURGERY, PLASTIC    Why:  For wound re-check    Contact information:    [address]  [phone]        Future Appointments     Apr 10, 2017  3:00 PM   PO SURGICAL PATHOLOGY TISSUE In process      Radiology Exams     None         Additional Information       We are concerned for your overall well being:    - If you are a smoker or have smoked in the last 12 months, we encourage you to explore options for qu experience these side effects or respond to medications the same. Please call your provider or healthcare team if you have any questions regarding your medications while at home.          Ant-Infective Medications     Cefadroxil 500 MG Oral Cap         Use: Mood and Thought Medications     Sertraline HCl (ZOLOFT) 50 MG Oral Tab       Use: Treat conditions such as depression and thought disorders   Most common side effects: Dizziness, drowsiness, problems with movement   What to report to your healthcare team:

## (undated) NOTE — IP AVS SNAPSHOT
[facility] [place] [address] [place], [address] ~ [phone]                Discharge Summary   4/27/2017    [facility] Doctor           Admission Information        Provider Department    4/27/2017 Hugo Finn MD  Alicia Alejo / Quentin London ENSTILAR 0.005-0.064 % Foam   Generic drug:  Calcipotriene-Betameth Diprop        Apply 1 Application topically daily.       [    ]    [    ]    [    ]    [    ]       Fluticasone Propionate 50 MCG/ACT Susp   Commonly known as:  FLONASE        2 sprays by Instructions and Information about Your Health     None      Follow-up Information     Schedule an appointment as soon as possible for a visit with Rosa Miramontes MD.    Specialty:  SURGERY, PLASTIC    Contact information:    [address] - If you are a smoker or have smoked in the last 12 months, we encourage you to explore options for quitting.     - If you have concerns related to behavioral health issues or thoughts of harming yourself, contact [facility] a